# Patient Record
Sex: FEMALE | Race: AMERICAN INDIAN OR ALASKA NATIVE | HISPANIC OR LATINO | Employment: FULL TIME | URBAN - METROPOLITAN AREA
[De-identification: names, ages, dates, MRNs, and addresses within clinical notes are randomized per-mention and may not be internally consistent; named-entity substitution may affect disease eponyms.]

---

## 2017-08-16 ENCOUNTER — OFFICE VISIT (OUTPATIENT)
Dept: OPHTHALMOLOGY | Facility: CLINIC | Age: 22
End: 2017-08-16

## 2017-08-16 DIAGNOSIS — C72.30 GLIOMA OF OPTIC NERVE (H): ICD-10-CM

## 2017-08-16 DIAGNOSIS — Q07.8 MORNING GLORY OPTIC DISC ANOMALY ASSOCIATED WITH MUTATION IN PAX6 GENE (H): Primary | ICD-10-CM

## 2017-08-16 DIAGNOSIS — H46.9 OPTIC NEUROPATHY: ICD-10-CM

## 2017-08-16 RX ORDER — NICOTINE POLACRILEX 4 MG/1
GUM, CHEWING ORAL
COMMUNITY
End: 2022-01-28

## 2017-08-16 RX ORDER — SUCRALFATE 1 G/1
TABLET ORAL
COMMUNITY
Start: 2017-05-31 | End: 2022-07-12

## 2017-08-16 RX ORDER — IMIPRAMINE HYDROCHLORIDE 10 MG/1
TABLET, FILM COATED ORAL
COMMUNITY
Start: 2017-05-23 | End: 2022-01-28

## 2017-08-16 ASSESSMENT — REFRACTION_MANIFEST
OS_SPHERE: +0.75
OS_SPHERE: +1.00
OS_AXIS: 075
OD_SPHERE: BALANCE
OD_SPHERE: BALANCE
OS_CYLINDER: +1.00
OS_CYLINDER: +1.50
OS_AXIS: 075

## 2017-08-16 ASSESSMENT — CUP TO DISC RATIO
OD_RATIO: NO VIEW
OS_RATIO: 0.4

## 2017-08-16 ASSESSMENT — VISUAL ACUITY
OS_CC: 20/15
CORRECTION_TYPE: GLASSES
METHOD_MR_RETINOSCOPY: 1
METHOD: SNELLEN - LINEAR
OS_CC+: -3
OD_CC: NLP

## 2017-08-16 ASSESSMENT — CONF VISUAL FIELD
OD_SUPERIOR_TEMPORAL_RESTRICTION: 1
OD_INFERIOR_TEMPORAL_RESTRICTION: 1
OD_SUPERIOR_NASAL_RESTRICTION: 1
OD_INFERIOR_NASAL_RESTRICTION: 1

## 2017-08-16 ASSESSMENT — REFRACTION_WEARINGRX
OS_CYLINDER: +1.00
OS_SPHERE: +1.00
OD_SPHERE: BALANCE
SPECS_TYPE: SVL
OS_AXIS: 074

## 2017-08-16 ASSESSMENT — SLIT LAMP EXAM - LIDS
COMMENTS: PTOSIS
COMMENTS: NORMAL

## 2017-08-16 ASSESSMENT — EXTERNAL EXAM - RIGHT EYE: OD_EXAM: NORMAL

## 2017-08-16 ASSESSMENT — EXTERNAL EXAM - LEFT EYE: OS_EXAM: NORMAL

## 2017-08-16 ASSESSMENT — TONOMETRY
OD_IOP_MMHG: 03
IOP_METHOD: ICARE
OS_IOP_MMHG: 16

## 2017-08-16 NOTE — PROGRESS NOTES
Assessment & Plan     Melissa Eastman is a 21 year old female with the following diagnoses:   1. Morning glory optic disc anomaly    2. Optic neuropathy    3. Glioma of optic nerve (H)       Follow up for MGDA RIGHT eye and optic nerve glioma in the RIGHT eye and right side of chiasm.  Stable on MRI in May 2016.  Her visual acuity is great in the LEFT eye and the visual field is normal.  The patient was told to wear glasses at all times to protect the good eye. The patient expressed understanding.      She has floaters and flashes.  No tobacco dust, retinal tear, or retinal detachment seen.   Retinal detachment warning symptoms were discussed.  The patient was asked to call as soon as possible if they occurred.      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            Attending Physician Attestation:  Complete documentation of historical and exam elements from today's encounter can be found in the full encounter summary report (not reduplicated in this progress note).  I personally obtained the chief complaint(s) and history of present illness.  I confirmed and edited as necessary the review of systems, past medical/surgical history, family history, social history, and examination findings as documented by others; and I examined the patient myself.  I personally reviewed the relevant tests, images, and reports as documented above.  I formulated and edited as necessary the assessment and plan and discussed the findings and management plan with the patient and family. - Isaías Willis MD

## 2017-08-16 NOTE — MR AVS SNAPSHOT
After Visit Summary   2017    Melissa Eastman    MRN: [id number]           Patient Information     Date Of Birth          1995        Visit Information        Provider Department      2017 2:45 PM Isaías Willis MD [facility]hthalmology        Today's Diagnoses     Morning glory optic disc anomaly    -  1    Optic neuropathy        Glioma of optic nerve (H)           Follow-ups after your visit        Who to contact     Please call your clinic at [phone] to:    Ask questions about your health    Make or cancel appointments    Discuss your medicines    Learn about your test results    Speak to your doctor   If you have compliments or concerns about an experience at your clinic, or if you wish to file a complaint, please contact [facility] Patient Relations at [phone] or email us at [email or web address]         Additional Information About Your Visit        MyChart Information     [employer] is an electronic gateway that provides easy, online access to your medical records. With [employer], you can request a clinic appointment, read your test results, renew a prescription or communicate with your care team.     To sign up for [employer] visit the website at [email or web address]   You will be asked to enter the access code listed below, as well as some personal information. Please follow the directions to create your username and password.     Your access code is: [email or web address]  Expires: 10/2/2017  6:30 AM     Your access code will  in 90 days. If you need help or a new code, please contact your [facility] or call [phone] for assistance.        Care EveryWhere ID     This is your Care EveryWhere ID. This could be used by other organizations to access your [place] medical records  [id number]         Blood Pressure from Last 3 Encounters:   No data found for BP    Weight from Last 3  Encounters:   No data found for Wt              We Performed the Following     Color Vision - Screening OU (both eyes)     DILATED FUNDUS EXAM     Glaucoma Top OU     IOP Measurement        Primary Care Provider Office Phone # Fax #    [facility] [phone]       [facility] [address], [address]  [facility] [address]        Equal Access to Services     ERIBERTO LEWIS : Hadii aad ku hadasho Soomaali, waaxda luqadaha, qaybta kaalmada adeegyada, waxay idiin hayaan adeeg khandrewjuan laallin ah. So [facility] [phone].    ATENCIÓN: Si habla español, tiene a agustin disposición servicios gratuitos de asistencia lingüística. Danielito al [phone].    We comply with applicable federal civil rights laws and [place] laws. We do not discriminate on the basis of race, color, national origin, age, disability sex, sexual orientation or gender identity.            Thank you!     Thank you for choosing [facility] OPHTHALMOLOGY  for your care. Our goal is always to provide you with excellent care. Hearing back from our patients is one way we can continue to improve our services. Please take a few minutes to complete the written survey that you may receive in the mail after your visit with us. Thank you!             Your Updated Medication List - Protect others around you: Learn how to safely use, store and throw away your medicines at www.disposemymeds.org.          This list is accurate as of: 8/16/17  3:44 PM.  Always use your most recent med list.                   Brand Name Dispense Instructions for use Diagnosis    etonogestrel-ethinyl estradiol 0.12-0.015 MG/24HR vaginal ring    NUVARING     Place 1 each vaginally every 28 days        imipramine 10 MG tablet    TOFRANIL     Take 1 tablet by mouth at  bedtime        omeprazole 20 MG tablet           sucralfate 1 GM tablet    CARAFATE     Take 1 tablet by mouth two  times daily        UNKNOWN TO PATIENT      Pt taking Biotin

## 2017-08-16 NOTE — NURSING NOTE
Chief Complaints and History of Present Illnesses   Patient presents with     Follow Up For     15 month f/u Optic neuropathy     HPI    Affected eye(s):  Both   Symptoms:     Blurred vision (Comment:  )   Difficulty with driving   Floaters (Comment: intermittently they get dark grey and turn translucent. )   No flashes         Do you have eye pain now?:  No      Comments:  Patient feels that she sees patterns, starts, and things moving that aren't there, feels she has intermittent pain in the RE - lasts a few minutes    Rita Quarles August 16, 2017 3:04 PM

## 2017-10-19 ENCOUNTER — OFFICE VISIT - HEALTHEAST (OUTPATIENT)
Dept: FAMILY MEDICINE | Facility: CLINIC | Age: 22
End: 2017-10-19

## 2017-10-19 DIAGNOSIS — K58.9 IRRITABLE BOWEL SYNDROME: ICD-10-CM

## 2017-10-19 DIAGNOSIS — Z00.00 ROUTINE GENERAL MEDICAL EXAMINATION AT A HEALTH CARE FACILITY: ICD-10-CM

## 2017-10-19 DIAGNOSIS — G89.29 CHRONIC LOW BACK PAIN: ICD-10-CM

## 2017-10-19 DIAGNOSIS — F41.1 ANXIETY STATE: ICD-10-CM

## 2017-10-19 DIAGNOSIS — Z30.9 CONTRACEPTION MANAGEMENT: ICD-10-CM

## 2017-10-19 DIAGNOSIS — H26.9 CATARACT, RIGHT: ICD-10-CM

## 2017-10-19 DIAGNOSIS — E66.811 OBESITY (BMI 30.0-34.9): ICD-10-CM

## 2017-10-19 DIAGNOSIS — Z72.51 HIGH RISK SEXUAL BEHAVIOR: ICD-10-CM

## 2017-10-19 DIAGNOSIS — M54.50 CHRONIC LOW BACK PAIN: ICD-10-CM

## 2017-10-19 DIAGNOSIS — H02.401 PTOSIS OF RIGHT EYELID: ICD-10-CM

## 2017-10-19 DIAGNOSIS — G47.00 INSOMNIA: ICD-10-CM

## 2017-10-19 DIAGNOSIS — Q14.2 OPTIC DISC ANOMALY: ICD-10-CM

## 2017-10-19 LAB
HBV SURFACE AG SERPL QL IA: NEGATIVE
HCV AB SERPL QL IA: NEGATIVE
HIV 1+2 AB+HIV1 P24 AG SERPL QL IA: NEGATIVE

## 2017-10-19 ASSESSMENT — MIFFLIN-ST. JEOR: SCORE: 1525.6

## 2017-10-20 LAB
HEPATITIS B SURFACE ANTIBODY LHE- HISTORICAL: NEGATIVE
SYPHILIS RPR SCREEN - HISTORICAL: NORMAL

## 2017-11-06 ENCOUNTER — COMMUNICATION - HEALTHEAST (OUTPATIENT)
Dept: FAMILY MEDICINE | Facility: CLINIC | Age: 22
End: 2017-11-06

## 2018-01-26 ENCOUNTER — OFFICE VISIT - HEALTHEAST (OUTPATIENT)
Dept: SLEEP MEDICINE | Facility: CLINIC | Age: 23
End: 2018-01-26

## 2018-01-26 DIAGNOSIS — G47.8 SLEEP DYSFUNCTION WITH SLEEP STAGE DISTURBANCE: ICD-10-CM

## 2018-01-26 DIAGNOSIS — G47.10 HYPERSOMNIA: ICD-10-CM

## 2018-01-26 DIAGNOSIS — G47.00 PERSISTENT INSOMNIA: ICD-10-CM

## 2018-01-26 DIAGNOSIS — G47.20 CIRCADIAN DYSREGULATION: ICD-10-CM

## 2018-01-26 ASSESSMENT — MIFFLIN-ST. JEOR: SCORE: 1475.26

## 2018-02-07 ENCOUNTER — AMBULATORY - HEALTHEAST (OUTPATIENT)
Dept: SLEEP MEDICINE | Facility: CLINIC | Age: 23
End: 2018-02-07

## 2018-03-16 ENCOUNTER — COMMUNICATION - HEALTHEAST (OUTPATIENT)
Dept: SLEEP MEDICINE | Facility: CLINIC | Age: 23
End: 2018-03-16

## 2018-08-09 ENCOUNTER — OFFICE VISIT - HEALTHEAST (OUTPATIENT)
Dept: FAMILY MEDICINE | Facility: CLINIC | Age: 23
End: 2018-08-09

## 2018-08-09 DIAGNOSIS — J30.2 SEASONAL ALLERGIES: ICD-10-CM

## 2018-08-09 DIAGNOSIS — M54.50 CHRONIC LOW BACK PAIN: ICD-10-CM

## 2018-08-09 DIAGNOSIS — Z78.9 NOT IMMUNE TO HEPATITIS B VIRUS: ICD-10-CM

## 2018-08-09 DIAGNOSIS — Z11.3 ROUTINE SCREENING FOR STI (SEXUALLY TRANSMITTED INFECTION): ICD-10-CM

## 2018-08-09 DIAGNOSIS — F43.22 ADJUSTMENT DISORDER WITH ANXIOUS MOOD: ICD-10-CM

## 2018-08-09 DIAGNOSIS — Z30.9 CONTRACEPTION MANAGEMENT: ICD-10-CM

## 2018-08-09 DIAGNOSIS — Q14.2 OPTIC DISC ANOMALY: ICD-10-CM

## 2018-08-09 DIAGNOSIS — F12.20 CANNABIS DEPENDENCE, UNCOMPLICATED (H): ICD-10-CM

## 2018-08-09 DIAGNOSIS — K58.9 IRRITABLE BOWEL SYNDROME: ICD-10-CM

## 2018-08-09 DIAGNOSIS — Z00.00 ROUTINE GENERAL MEDICAL EXAMINATION AT A HEALTH CARE FACILITY: ICD-10-CM

## 2018-08-09 DIAGNOSIS — L60.3 BRITTLE NAILS: ICD-10-CM

## 2018-08-09 DIAGNOSIS — C72.50: ICD-10-CM

## 2018-08-09 DIAGNOSIS — E66.3 OVERWEIGHT WITH BODY MASS INDEX (BMI) 25.0-29.9: ICD-10-CM

## 2018-08-09 DIAGNOSIS — G89.29 CHRONIC LOW BACK PAIN: ICD-10-CM

## 2018-08-09 LAB
CHOLEST SERPL-MCNC: 217 MG/DL
CLUE CELLS: NORMAL
FASTING STATUS PATIENT QL REPORTED: YES
FASTING STATUS PATIENT QL REPORTED: YES
GLUCOSE BLD-MCNC: 77 MG/DL (ref 70–99)
HDLC SERPL-MCNC: 72 MG/DL
HIV 1+2 AB+HIV1 P24 AG SERPL QL IA: NEGATIVE
LDLC SERPL CALC-MCNC: 127 MG/DL
TRICHOMONAS, WET PREP: NORMAL
TRIGL SERPL-MCNC: 92 MG/DL
TSH SERPL DL<=0.005 MIU/L-ACNC: 0.66 UIU/ML (ref 0.3–5)
YEAST, WET PREP: NORMAL

## 2018-08-09 ASSESSMENT — MIFFLIN-ST. JEOR: SCORE: 1448.49

## 2018-08-10 ENCOUNTER — COMMUNICATION - HEALTHEAST (OUTPATIENT)
Dept: FAMILY MEDICINE | Facility: CLINIC | Age: 23
End: 2018-08-10

## 2018-08-10 LAB
C TRACH DNA SPEC QL PROBE+SIG AMP: NEGATIVE
HCV AB SERPL QL IA: NEGATIVE
HEPATITIS B SURFACE ANTIBODY LHE- HISTORICAL: NEGATIVE
N GONORRHOEA DNA SPEC QL NAA+PROBE: NEGATIVE
T PALLIDUM AB SER QL: NEGATIVE

## 2018-08-14 ENCOUNTER — COMMUNICATION - HEALTHEAST (OUTPATIENT)
Dept: FAMILY MEDICINE | Facility: CLINIC | Age: 23
End: 2018-08-14

## 2018-08-14 ENCOUNTER — AMBULATORY - HEALTHEAST (OUTPATIENT)
Dept: FAMILY MEDICINE | Facility: CLINIC | Age: 23
End: 2018-08-14

## 2018-08-14 DIAGNOSIS — Z11.3 ROUTINE SCREENING FOR STI (SEXUALLY TRANSMITTED INFECTION): ICD-10-CM

## 2018-08-14 DIAGNOSIS — R87.613 HSIL (HIGH GRADE SQUAMOUS INTRAEPITHELIAL LESION) ON PAP SMEAR OF CERVIX: ICD-10-CM

## 2018-08-14 LAB
BKR LAB AP ABNORMAL BLEEDING: NO
BKR LAB AP BIRTH CONTROL/HORMONES: ABNORMAL
BKR LAB AP CERVICAL APPEARANCE: NORMAL
BKR LAB AP GYN ADEQUACY: ABNORMAL
BKR LAB AP GYN INTERPRETATION: ABNORMAL
BKR LAB AP HPV REFLEX: ABNORMAL
BKR LAB AP LMP: ABNORMAL
BKR LAB AP PATIENT STATUS: ABNORMAL
BKR LAB AP PREVIOUS ABNORMAL: ABNORMAL
BKR LAB AP PREVIOUS NORMAL: ABNORMAL
HIGH RISK?: NO
HPV SOURCE: ABNORMAL
HUMAN PAPILLOMA VIRUS 16 DNA: NEGATIVE
HUMAN PAPILLOMA VIRUS 18 DNA: NEGATIVE
HUMAN PAPILLOMA VIRUS FINAL DIAGNOSIS: ABNORMAL
HUMAN PAPILLOMA VIRUS OTHER HR: POSITIVE
PATH REPORT.COMMENTS IMP SPEC: ABNORMAL
RESULT FLAG (HE HISTORICAL CONVERSION): ABNORMAL
SPECIMEN DESCRIPTION: ABNORMAL

## 2018-09-21 ENCOUNTER — RECORDS - HEALTHEAST (OUTPATIENT)
Dept: ADMINISTRATIVE | Facility: OTHER | Age: 23
End: 2018-09-21

## 2019-01-29 ENCOUNTER — OFFICE VISIT - HEALTHEAST (OUTPATIENT)
Dept: FAMILY MEDICINE | Facility: CLINIC | Age: 24
End: 2019-01-29

## 2019-01-29 DIAGNOSIS — T14.8XXA SPLINTER: ICD-10-CM

## 2019-02-07 ENCOUNTER — OFFICE VISIT - HEALTHEAST (OUTPATIENT)
Dept: FAMILY MEDICINE | Facility: CLINIC | Age: 24
End: 2019-02-07

## 2019-02-07 DIAGNOSIS — F33.8 SEASONAL AFFECTIVE DISORDER (H): ICD-10-CM

## 2019-02-07 DIAGNOSIS — K58.9 IRRITABLE BOWEL SYNDROME: ICD-10-CM

## 2019-02-07 DIAGNOSIS — F41.1 GENERALIZED ANXIETY DISORDER: ICD-10-CM

## 2019-02-07 DIAGNOSIS — F41.0 PANIC DISORDER WITHOUT AGORAPHOBIA: ICD-10-CM

## 2019-02-07 DIAGNOSIS — M54.5 CHRONIC LOW BACK PAIN, UNSPECIFIED BACK PAIN LATERALITY, WITH SCIATICA PRESENCE UNSPECIFIED: ICD-10-CM

## 2019-02-07 DIAGNOSIS — G89.29 CHRONIC LOW BACK PAIN, UNSPECIFIED BACK PAIN LATERALITY, WITH SCIATICA PRESENCE UNSPECIFIED: ICD-10-CM

## 2019-02-07 DIAGNOSIS — F33.1 MODERATE RECURRENT MAJOR DEPRESSION (H): ICD-10-CM

## 2019-02-07 LAB
TSH SERPL DL<=0.005 MIU/L-ACNC: 0.47 UIU/ML (ref 0.3–5)
VIT B12 SERPL-MCNC: 292 PG/ML (ref 213–816)

## 2019-02-08 LAB
25(OH)D3 SERPL-MCNC: 27.9 NG/ML (ref 30–80)
25(OH)D3 SERPL-MCNC: 27.9 NG/ML (ref 30–80)

## 2019-02-10 ENCOUNTER — COMMUNICATION - HEALTHEAST (OUTPATIENT)
Dept: FAMILY MEDICINE | Facility: CLINIC | Age: 24
End: 2019-02-10

## 2019-02-11 ENCOUNTER — AMBULATORY - HEALTHEAST (OUTPATIENT)
Dept: FAMILY MEDICINE | Facility: CLINIC | Age: 24
End: 2019-02-11

## 2019-02-11 DIAGNOSIS — E53.8 LOW SERUM VITAMIN B12: ICD-10-CM

## 2019-02-15 ENCOUNTER — AMBULATORY - HEALTHEAST (OUTPATIENT)
Dept: LAB | Facility: CLINIC | Age: 24
End: 2019-02-15

## 2019-02-15 DIAGNOSIS — E53.8 LOW SERUM VITAMIN B12: ICD-10-CM

## 2019-02-17 LAB — METHYLMALONATE SERPL-SCNC: 0.16 UMOL/L (ref 0–0.4)

## 2019-02-19 LAB
FASTING STATUS PATIENT QL REPORTED: YES
HOMOCYSTEINE PLASMA - HISTORICAL: 7 UMOL/L (ref 0–13)

## 2019-02-25 ENCOUNTER — TELEPHONE (OUTPATIENT)
Dept: OPHTHALMOLOGY | Facility: CLINIC | Age: 24
End: 2019-02-25

## 2019-02-25 NOTE — TELEPHONE ENCOUNTER
[facility] Call Center    Phone Message    May a detailed message be left on voicemail: yes    Reason for Call: Raul is needing Dr. Isaías Willis to fax pts prescription to  Fax#[phone] per Raul. Thank you  Action Taken: Message routed to:  [facility]

## 2019-02-28 ENCOUNTER — TELEPHONE (OUTPATIENT)
Dept: OPHTHALMOLOGY | Facility: CLINIC | Age: 24
End: 2019-02-28

## 2019-03-08 ENCOUNTER — RECORDS - HEALTHEAST (OUTPATIENT)
Dept: ADMINISTRATIVE | Facility: OTHER | Age: 24
End: 2019-03-08

## 2019-03-08 ENCOUNTER — OFFICE VISIT - HEALTHEAST (OUTPATIENT)
Dept: FAMILY MEDICINE | Facility: CLINIC | Age: 24
End: 2019-03-08

## 2019-03-08 DIAGNOSIS — F41.1 GENERALIZED ANXIETY DISORDER: ICD-10-CM

## 2019-03-08 DIAGNOSIS — F33.1 MODERATE RECURRENT MAJOR DEPRESSION (H): ICD-10-CM

## 2019-03-08 DIAGNOSIS — F33.8 SEASONAL AFFECTIVE DISORDER (H): ICD-10-CM

## 2019-03-08 DIAGNOSIS — G89.29 CHRONIC LOW BACK PAIN, UNSPECIFIED BACK PAIN LATERALITY, WITH SCIATICA PRESENCE UNSPECIFIED: ICD-10-CM

## 2019-03-08 DIAGNOSIS — M54.5 CHRONIC LOW BACK PAIN, UNSPECIFIED BACK PAIN LATERALITY, WITH SCIATICA PRESENCE UNSPECIFIED: ICD-10-CM

## 2019-03-08 ASSESSMENT — MIFFLIN-ST. JEOR: SCORE: 1456.43

## 2019-03-20 ENCOUNTER — COMMUNICATION - HEALTHEAST (OUTPATIENT)
Dept: FAMILY MEDICINE | Facility: CLINIC | Age: 24
End: 2019-03-20

## 2019-03-28 ENCOUNTER — COMMUNICATION - HEALTHEAST (OUTPATIENT)
Dept: FAMILY MEDICINE | Facility: CLINIC | Age: 24
End: 2019-03-28

## 2019-03-28 DIAGNOSIS — F33.1 MODERATE RECURRENT MAJOR DEPRESSION (H): ICD-10-CM

## 2019-03-28 DIAGNOSIS — F41.1 GENERALIZED ANXIETY DISORDER: ICD-10-CM

## 2019-03-28 DIAGNOSIS — F33.8 SEASONAL AFFECTIVE DISORDER (H): ICD-10-CM

## 2019-03-31 ENCOUNTER — RECORDS - HEALTHEAST (OUTPATIENT)
Dept: ADMINISTRATIVE | Facility: OTHER | Age: 24
End: 2019-03-31

## 2019-04-05 ENCOUNTER — OFFICE VISIT - HEALTHEAST (OUTPATIENT)
Dept: FAMILY MEDICINE | Facility: CLINIC | Age: 24
End: 2019-04-05

## 2019-04-05 DIAGNOSIS — F41.1 GENERALIZED ANXIETY DISORDER: ICD-10-CM

## 2019-04-05 DIAGNOSIS — K29.20 ACUTE ALCOHOLIC GASTRITIS WITHOUT HEMORRHAGE: ICD-10-CM

## 2019-04-05 DIAGNOSIS — F33.1 MODERATE RECURRENT MAJOR DEPRESSION (H): ICD-10-CM

## 2019-04-09 ENCOUNTER — COMMUNICATION - HEALTHEAST (OUTPATIENT)
Dept: FAMILY MEDICINE | Facility: CLINIC | Age: 24
End: 2019-04-09

## 2019-04-09 DIAGNOSIS — F33.1 MODERATE RECURRENT MAJOR DEPRESSION (H): ICD-10-CM

## 2019-05-06 ENCOUNTER — COMMUNICATION - HEALTHEAST (OUTPATIENT)
Dept: FAMILY MEDICINE | Facility: CLINIC | Age: 24
End: 2019-05-06

## 2019-05-06 ENCOUNTER — OFFICE VISIT - HEALTHEAST (OUTPATIENT)
Dept: FAMILY MEDICINE | Facility: CLINIC | Age: 24
End: 2019-05-06

## 2019-05-06 DIAGNOSIS — R68.2 DRY MOUTH: ICD-10-CM

## 2019-05-06 DIAGNOSIS — R87.613 HSIL (HIGH GRADE SQUAMOUS INTRAEPITHELIAL LESION) ON PAP SMEAR OF CERVIX: ICD-10-CM

## 2019-05-06 DIAGNOSIS — N93.0 BLEEDING AFTER INTERCOURSE: ICD-10-CM

## 2019-05-06 DIAGNOSIS — Z30.012 EMERGENCY CONTRACEPTION: ICD-10-CM

## 2019-05-06 LAB
CLUE CELLS: NORMAL
ERYTHROCYTE [DISTWIDTH] IN BLOOD BY AUTOMATED COUNT: 12.3 % (ref 11–14.5)
HBA1C MFR BLD: 5.4 % (ref 3.5–6)
HCT VFR BLD AUTO: 46.8 % (ref 35–47)
HGB BLD-MCNC: 15.5 G/DL (ref 12–16)
MCH RBC QN AUTO: 28.4 PG (ref 27–34)
MCHC RBC AUTO-ENTMCNC: 33.2 G/DL (ref 32–36)
MCV RBC AUTO: 85 FL (ref 80–100)
PLATELET # BLD AUTO: 277 THOU/UL (ref 140–440)
PMV BLD AUTO: 9 FL (ref 7–10)
RBC # BLD AUTO: 5.48 MILL/UL (ref 3.8–5.4)
TRICHOMONAS, WET PREP: NORMAL
WBC: 4.2 THOU/UL (ref 4–11)
YEAST, WET PREP: NORMAL

## 2019-05-07 LAB
C TRACH DNA SPEC QL PROBE+SIG AMP: NEGATIVE
N GONORRHOEA DNA SPEC QL NAA+PROBE: NEGATIVE

## 2019-05-09 ENCOUNTER — COMMUNICATION - HEALTHEAST (OUTPATIENT)
Dept: FAMILY MEDICINE | Facility: CLINIC | Age: 24
End: 2019-05-09

## 2019-05-31 ENCOUNTER — RECORDS - HEALTHEAST (OUTPATIENT)
Dept: ADMINISTRATIVE | Facility: OTHER | Age: 24
End: 2019-05-31

## 2019-05-31 LAB
HPV_EXT - HISTORICAL: NORMAL
PAP SMEAR - HIM PATIENT REPORTED: ABNORMAL

## 2019-06-18 ENCOUNTER — RECORDS - HEALTHEAST (OUTPATIENT)
Dept: HEALTH INFORMATION MANAGEMENT | Facility: CLINIC | Age: 24
End: 2019-06-18

## 2019-07-17 ENCOUNTER — COMMUNICATION - HEALTHEAST (OUTPATIENT)
Dept: FAMILY MEDICINE | Facility: CLINIC | Age: 24
End: 2019-07-17

## 2019-07-17 DIAGNOSIS — F41.1 GENERALIZED ANXIETY DISORDER: ICD-10-CM

## 2019-07-17 DIAGNOSIS — F33.1 MODERATE RECURRENT MAJOR DEPRESSION (H): ICD-10-CM

## 2019-07-24 ENCOUNTER — COMMUNICATION - HEALTHEAST (OUTPATIENT)
Dept: FAMILY MEDICINE | Facility: CLINIC | Age: 24
End: 2019-07-24

## 2019-07-24 DIAGNOSIS — F41.1 GENERALIZED ANXIETY DISORDER: ICD-10-CM

## 2019-07-24 DIAGNOSIS — F33.1 MODERATE RECURRENT MAJOR DEPRESSION (H): ICD-10-CM

## 2019-08-19 ENCOUNTER — COMMUNICATION - HEALTHEAST (OUTPATIENT)
Dept: FAMILY MEDICINE | Facility: CLINIC | Age: 24
End: 2019-08-19

## 2019-08-19 DIAGNOSIS — Z30.9 CONTRACEPTION MANAGEMENT: ICD-10-CM

## 2019-09-16 ENCOUNTER — COMMUNICATION - HEALTHEAST (OUTPATIENT)
Dept: FAMILY MEDICINE | Facility: CLINIC | Age: 24
End: 2019-09-16

## 2019-09-16 DIAGNOSIS — F41.1 GENERALIZED ANXIETY DISORDER: ICD-10-CM

## 2019-09-16 DIAGNOSIS — F33.1 MODERATE RECURRENT MAJOR DEPRESSION (H): ICD-10-CM

## 2019-10-03 ENCOUNTER — OFFICE VISIT - HEALTHEAST (OUTPATIENT)
Dept: FAMILY MEDICINE | Facility: CLINIC | Age: 24
End: 2019-10-03

## 2019-10-03 DIAGNOSIS — Z00.00 HEALTH CARE MAINTENANCE: ICD-10-CM

## 2019-10-03 DIAGNOSIS — F41.1 GENERALIZED ANXIETY DISORDER: ICD-10-CM

## 2019-10-03 DIAGNOSIS — Z20.2 POSSIBLE EXPOSURE TO STD: ICD-10-CM

## 2019-10-03 DIAGNOSIS — M54.50 CHRONIC LOW BACK PAIN, UNSPECIFIED BACK PAIN LATERALITY, UNSPECIFIED WHETHER SCIATICA PRESENT: ICD-10-CM

## 2019-10-03 DIAGNOSIS — F33.1 MODERATE RECURRENT MAJOR DEPRESSION (H): ICD-10-CM

## 2019-10-03 DIAGNOSIS — G89.29 CHRONIC LOW BACK PAIN, UNSPECIFIED BACK PAIN LATERALITY, UNSPECIFIED WHETHER SCIATICA PRESENT: ICD-10-CM

## 2019-10-03 DIAGNOSIS — Z30.9 CONTRACEPTION MANAGEMENT: ICD-10-CM

## 2019-10-03 DIAGNOSIS — F33.8 SEASONAL AFFECTIVE DISORDER (H): ICD-10-CM

## 2019-10-03 DIAGNOSIS — F41.0 PANIC DISORDER WITHOUT AGORAPHOBIA: ICD-10-CM

## 2019-10-03 LAB
CLUE CELLS: NORMAL
HIV 1+2 AB+HIV1 P24 AG SERPL QL IA: NEGATIVE
TRICHOMONAS, WET PREP: NORMAL
YEAST, WET PREP: NORMAL

## 2019-10-04 LAB
C TRACH DNA SPEC QL PROBE+SIG AMP: NEGATIVE
HBV SURFACE AG SERPL QL IA: NEGATIVE
HCV AB SERPL QL IA: NEGATIVE
HEPATITIS B SURFACE ANTIBODY LHE- HISTORICAL: POSITIVE
N GONORRHOEA DNA SPEC QL NAA+PROBE: NEGATIVE
T PALLIDUM AB SER QL: NEGATIVE

## 2019-10-06 ENCOUNTER — COMMUNICATION - HEALTHEAST (OUTPATIENT)
Dept: FAMILY MEDICINE | Facility: CLINIC | Age: 24
End: 2019-10-06

## 2019-10-06 DIAGNOSIS — F33.1 MODERATE RECURRENT MAJOR DEPRESSION (H): ICD-10-CM

## 2019-10-06 DIAGNOSIS — F41.1 GENERALIZED ANXIETY DISORDER: ICD-10-CM

## 2019-10-23 ENCOUNTER — COMMUNICATION - HEALTHEAST (OUTPATIENT)
Dept: FAMILY MEDICINE | Facility: CLINIC | Age: 24
End: 2019-10-23

## 2019-10-23 DIAGNOSIS — K58.9 IRRITABLE BOWEL SYNDROME: ICD-10-CM

## 2019-10-23 DIAGNOSIS — K58.9 IRRITABLE BOWEL SYNDROME, UNSPECIFIED TYPE: ICD-10-CM

## 2019-10-26 ENCOUNTER — OFFICE VISIT - HEALTHEAST (OUTPATIENT)
Dept: FAMILY MEDICINE | Facility: CLINIC | Age: 24
End: 2019-10-26

## 2019-10-26 DIAGNOSIS — L30.8 OTHER ECZEMA: ICD-10-CM

## 2019-10-26 ASSESSMENT — MIFFLIN-ST. JEOR: SCORE: 1581.17

## 2019-11-19 ENCOUNTER — OFFICE VISIT - HEALTHEAST (OUTPATIENT)
Dept: FAMILY MEDICINE | Facility: CLINIC | Age: 24
End: 2019-11-19

## 2019-11-19 ENCOUNTER — COMMUNICATION - HEALTHEAST (OUTPATIENT)
Dept: FAMILY MEDICINE | Facility: CLINIC | Age: 24
End: 2019-11-19

## 2019-11-19 DIAGNOSIS — R21 RASH AND NONSPECIFIC SKIN ERUPTION: ICD-10-CM

## 2019-11-19 ASSESSMENT — PATIENT HEALTH QUESTIONNAIRE - PHQ9: SUM OF ALL RESPONSES TO PHQ QUESTIONS 1-9: 15

## 2019-11-19 ASSESSMENT — MIFFLIN-ST. JEOR: SCORE: 1623.13

## 2019-12-16 ENCOUNTER — OFFICE VISIT - HEALTHEAST (OUTPATIENT)
Dept: FAMILY MEDICINE | Facility: CLINIC | Age: 24
End: 2019-12-16

## 2019-12-16 DIAGNOSIS — K58.9 IRRITABLE BOWEL SYNDROME, UNSPECIFIED TYPE: ICD-10-CM

## 2019-12-16 DIAGNOSIS — F33.1 MODERATE RECURRENT MAJOR DEPRESSION (H): ICD-10-CM

## 2019-12-16 DIAGNOSIS — R21 RASH: ICD-10-CM

## 2019-12-16 DIAGNOSIS — Z20.2 POSSIBLE EXPOSURE TO STD: ICD-10-CM

## 2019-12-16 DIAGNOSIS — G89.29 CHRONIC LOW BACK PAIN, UNSPECIFIED BACK PAIN LATERALITY, UNSPECIFIED WHETHER SCIATICA PRESENT: ICD-10-CM

## 2019-12-16 DIAGNOSIS — F41.1 GENERALIZED ANXIETY DISORDER: ICD-10-CM

## 2019-12-16 DIAGNOSIS — M54.50 CHRONIC LOW BACK PAIN, UNSPECIFIED BACK PAIN LATERALITY, UNSPECIFIED WHETHER SCIATICA PRESENT: ICD-10-CM

## 2019-12-16 DIAGNOSIS — F33.8 SEASONAL AFFECTIVE DISORDER (H): ICD-10-CM

## 2019-12-16 DIAGNOSIS — F41.0 PANIC DISORDER WITHOUT AGORAPHOBIA: ICD-10-CM

## 2019-12-16 ASSESSMENT — MIFFLIN-ST. JEOR: SCORE: 1635.03

## 2019-12-17 LAB
C TRACH DNA SPEC QL PROBE+SIG AMP: NEGATIVE
HCV AB SERPL QL IA: NEGATIVE
N GONORRHOEA DNA SPEC QL NAA+PROBE: NEGATIVE
T PALLIDUM AB SER QL: NEGATIVE

## 2019-12-27 ENCOUNTER — COMMUNICATION - HEALTHEAST (OUTPATIENT)
Dept: FAMILY MEDICINE | Facility: CLINIC | Age: 24
End: 2019-12-27

## 2019-12-27 DIAGNOSIS — M54.50 CHRONIC LOW BACK PAIN, UNSPECIFIED BACK PAIN LATERALITY, UNSPECIFIED WHETHER SCIATICA PRESENT: ICD-10-CM

## 2019-12-27 DIAGNOSIS — G89.29 CHRONIC LOW BACK PAIN, UNSPECIFIED BACK PAIN LATERALITY, UNSPECIFIED WHETHER SCIATICA PRESENT: ICD-10-CM

## 2019-12-27 DIAGNOSIS — F33.8 SEASONAL AFFECTIVE DISORDER (H): ICD-10-CM

## 2019-12-27 DIAGNOSIS — F12.20 CANNABIS DEPENDENCE, UNCOMPLICATED (H): ICD-10-CM

## 2019-12-27 DIAGNOSIS — F41.0 PANIC DISORDER WITHOUT AGORAPHOBIA: ICD-10-CM

## 2019-12-27 DIAGNOSIS — F33.1 MODERATE RECURRENT MAJOR DEPRESSION (H): ICD-10-CM

## 2019-12-27 DIAGNOSIS — K58.9 IRRITABLE BOWEL SYNDROME, UNSPECIFIED TYPE: ICD-10-CM

## 2019-12-27 DIAGNOSIS — F41.1 GENERALIZED ANXIETY DISORDER: ICD-10-CM

## 2019-12-31 ENCOUNTER — COMMUNICATION - HEALTHEAST (OUTPATIENT)
Dept: FAMILY MEDICINE | Facility: CLINIC | Age: 24
End: 2019-12-31

## 2020-01-14 ENCOUNTER — RECORDS - HEALTHEAST (OUTPATIENT)
Dept: ADMINISTRATIVE | Facility: OTHER | Age: 25
End: 2020-01-14

## 2020-01-14 ENCOUNTER — OFFICE VISIT - HEALTHEAST (OUTPATIENT)
Dept: FAMILY MEDICINE | Facility: CLINIC | Age: 25
End: 2020-01-14

## 2020-01-14 DIAGNOSIS — F33.8 SEASONAL AFFECTIVE DISORDER (H): ICD-10-CM

## 2020-01-14 DIAGNOSIS — F33.1 MODERATE RECURRENT MAJOR DEPRESSION (H): ICD-10-CM

## 2020-01-14 DIAGNOSIS — F41.0 PANIC DISORDER WITHOUT AGORAPHOBIA: ICD-10-CM

## 2020-01-14 DIAGNOSIS — F41.1 GENERALIZED ANXIETY DISORDER: ICD-10-CM

## 2020-01-14 ASSESSMENT — PATIENT HEALTH QUESTIONNAIRE - PHQ9: SUM OF ALL RESPONSES TO PHQ QUESTIONS 1-9: 18

## 2020-01-15 ENCOUNTER — RECORDS - HEALTHEAST (OUTPATIENT)
Dept: ADMINISTRATIVE | Facility: OTHER | Age: 25
End: 2020-01-15

## 2020-01-15 LAB
HPV_EXT - HISTORICAL: NORMAL
PAP SMEAR - HIM PATIENT REPORTED: ABNORMAL

## 2020-01-16 ENCOUNTER — COMMUNICATION - HEALTHEAST (OUTPATIENT)
Dept: FAMILY MEDICINE | Facility: CLINIC | Age: 25
End: 2020-01-16

## 2020-01-22 ENCOUNTER — OFFICE VISIT - HEALTHEAST (OUTPATIENT)
Dept: FAMILY MEDICINE | Facility: CLINIC | Age: 25
End: 2020-01-22

## 2020-01-22 DIAGNOSIS — J11.1 INFLUENZA-LIKE ILLNESS: ICD-10-CM

## 2020-01-29 ENCOUNTER — RECORDS - HEALTHEAST (OUTPATIENT)
Dept: HEALTH INFORMATION MANAGEMENT | Facility: CLINIC | Age: 25
End: 2020-01-29

## 2020-02-12 ENCOUNTER — RECORDS - HEALTHEAST (OUTPATIENT)
Dept: ADMINISTRATIVE | Facility: OTHER | Age: 25
End: 2020-02-12

## 2020-03-02 ENCOUNTER — COMMUNICATION - HEALTHEAST (OUTPATIENT)
Dept: FAMILY MEDICINE | Facility: CLINIC | Age: 25
End: 2020-03-02

## 2020-03-02 DIAGNOSIS — F41.1 GENERALIZED ANXIETY DISORDER: ICD-10-CM

## 2020-03-02 DIAGNOSIS — F33.8 SEASONAL AFFECTIVE DISORDER (H): ICD-10-CM

## 2020-03-02 DIAGNOSIS — F33.1 MODERATE RECURRENT MAJOR DEPRESSION (H): ICD-10-CM

## 2020-04-03 ENCOUNTER — COMMUNICATION - HEALTHEAST (OUTPATIENT)
Dept: FAMILY MEDICINE | Facility: CLINIC | Age: 25
End: 2020-04-03

## 2020-04-03 DIAGNOSIS — F33.1 MODERATE RECURRENT MAJOR DEPRESSION (H): ICD-10-CM

## 2020-04-03 DIAGNOSIS — F41.1 GENERALIZED ANXIETY DISORDER: ICD-10-CM

## 2020-04-03 DIAGNOSIS — F33.8 SEASONAL AFFECTIVE DISORDER (H): ICD-10-CM

## 2020-05-11 ENCOUNTER — COMMUNICATION - HEALTHEAST (OUTPATIENT)
Dept: FAMILY MEDICINE | Facility: CLINIC | Age: 25
End: 2020-05-11

## 2020-05-11 DIAGNOSIS — F33.8 SEASONAL AFFECTIVE DISORDER (H): ICD-10-CM

## 2020-05-11 DIAGNOSIS — F41.1 GENERALIZED ANXIETY DISORDER: ICD-10-CM

## 2020-05-11 DIAGNOSIS — F33.1 MODERATE RECURRENT MAJOR DEPRESSION (H): ICD-10-CM

## 2020-05-13 ENCOUNTER — COMMUNICATION - HEALTHEAST (OUTPATIENT)
Dept: FAMILY MEDICINE | Facility: CLINIC | Age: 25
End: 2020-05-13

## 2020-05-13 DIAGNOSIS — F41.1 GENERALIZED ANXIETY DISORDER: ICD-10-CM

## 2020-05-13 DIAGNOSIS — F41.0 PANIC DISORDER WITHOUT AGORAPHOBIA: ICD-10-CM

## 2020-05-13 DIAGNOSIS — F33.1 MODERATE RECURRENT MAJOR DEPRESSION (H): ICD-10-CM

## 2020-05-13 DIAGNOSIS — F33.8 SEASONAL AFFECTIVE DISORDER (H): ICD-10-CM

## 2020-05-22 ENCOUNTER — RECORDS - HEALTHEAST (OUTPATIENT)
Dept: ADMINISTRATIVE | Facility: OTHER | Age: 25
End: 2020-05-22

## 2020-05-28 ENCOUNTER — COMMUNICATION - HEALTHEAST (OUTPATIENT)
Dept: FAMILY MEDICINE | Facility: CLINIC | Age: 25
End: 2020-05-28

## 2020-08-26 ENCOUNTER — COMMUNICATION - HEALTHEAST (OUTPATIENT)
Dept: FAMILY MEDICINE | Facility: CLINIC | Age: 25
End: 2020-08-26

## 2020-08-26 DIAGNOSIS — R87.613 HSIL (HIGH GRADE SQUAMOUS INTRAEPITHELIAL LESION) ON PAP SMEAR OF CERVIX: ICD-10-CM

## 2020-08-28 ENCOUNTER — AMBULATORY - HEALTHEAST (OUTPATIENT)
Dept: INTERNAL MEDICINE | Facility: CLINIC | Age: 25
End: 2020-08-28

## 2020-08-31 ENCOUNTER — COMMUNICATION - HEALTHEAST (OUTPATIENT)
Dept: SCHEDULING | Facility: CLINIC | Age: 25
End: 2020-08-31

## 2020-08-31 DIAGNOSIS — R87.613 HSIL (HIGH GRADE SQUAMOUS INTRAEPITHELIAL LESION) ON PAP SMEAR OF CERVIX: ICD-10-CM

## 2020-09-01 NOTE — TELEPHONE ENCOUNTER
Detail Level: Detailed Last glasses Rx faxed per request at 0935  Rx from 8-2017.  Expires 8-2019  Adama Rae RN 9:36 AM 02/28/19        [facility]    Phone Message    May a detailed message be left on voicemail: yes    Reason for Call: Other: per pts mom mary ann- needs glasses rx faxed to eye care center @ [phone]-  gave us the wrong fax number originally, please fax her glasses rx, thank you      Action Taken: Message routed to:  [facility]

## 2020-09-02 ENCOUNTER — OFFICE VISIT - HEALTHEAST (OUTPATIENT)
Dept: INTERNAL MEDICINE | Facility: CLINIC | Age: 25
End: 2020-09-02

## 2020-09-02 ENCOUNTER — COMMUNICATION - HEALTHEAST (OUTPATIENT)
Dept: SCHEDULING | Facility: CLINIC | Age: 25
End: 2020-09-02

## 2020-09-02 DIAGNOSIS — N64.4 BREAST PAIN, LEFT: ICD-10-CM

## 2020-09-02 ASSESSMENT — MIFFLIN-ST. JEOR: SCORE: 1771.11

## 2020-09-17 ENCOUNTER — OFFICE VISIT (OUTPATIENT)
Dept: OPHTHALMOLOGY | Facility: CLINIC | Age: 25
End: 2020-09-17
Attending: OPHTHALMOLOGY
Payer: COMMERCIAL

## 2020-09-17 DIAGNOSIS — H53.40 VISUAL FIELD DEFECT: ICD-10-CM

## 2020-09-17 DIAGNOSIS — H53.40 VISUAL FIELD DEFECT: Primary | ICD-10-CM

## 2020-09-17 DIAGNOSIS — H44.521 PHTHISIS BULBI OF RIGHT EYE: Primary | ICD-10-CM

## 2020-09-17 PROCEDURE — 92015 DETERMINE REFRACTIVE STATE: CPT | Mod: ZF | Performed by: TECHNICIAN/TECHNOLOGIST

## 2020-09-17 PROCEDURE — 92083 EXTENDED VISUAL FIELD XM: CPT | Mod: ZF | Performed by: OPHTHALMOLOGY

## 2020-09-17 PROCEDURE — 92133 CPTRZD OPH DX IMG PST SGM ON: CPT | Mod: ZF | Performed by: OPHTHALMOLOGY

## 2020-09-17 PROCEDURE — 76512 OPH US DX B-SCAN: CPT | Mod: ZF | Performed by: OPHTHALMOLOGY

## 2020-09-17 PROCEDURE — G0463 HOSPITAL OUTPT CLINIC VISIT: HCPCS | Mod: ZF | Performed by: TECHNICIAN/TECHNOLOGIST

## 2020-09-17 ASSESSMENT — CONF VISUAL FIELD
OD_INFERIOR_NASAL_RESTRICTION: 1
OD_SUPERIOR_TEMPORAL_RESTRICTION: 1
OD_SUPERIOR_NASAL_RESTRICTION: 1
OD_INFERIOR_TEMPORAL_RESTRICTION: 1

## 2020-09-17 ASSESSMENT — TONOMETRY
IOP_METHOD: ICARE
OS_IOP_MMHG: 11
OD_IOP_MMHG: 03

## 2020-09-17 ASSESSMENT — VISUAL ACUITY
CORRECTION_TYPE: GLASSES
OD_CC: NLP
METHOD: SNELLEN - LINEAR
OS_CC: 20/20

## 2020-09-17 ASSESSMENT — REFRACTION_WEARINGRX
OD_SPHERE: BALANCE
OS_CYLINDER: +1.00
OS_SPHERE: +1.00
SPECS_TYPE: SVL
OS_AXIS: 074

## 2020-09-17 ASSESSMENT — REFRACTION_MANIFEST
OS_SPHERE: +0.25
OS_CYLINDER: +1.25
OS_AXIS: 075
OD_SPHERE: BALANCE

## 2020-09-17 ASSESSMENT — CUP TO DISC RATIO
OD_RATIO: NO VIEW
OS_RATIO: 0.6

## 2020-09-17 ASSESSMENT — SLIT LAMP EXAM - LIDS
COMMENTS: PTOSIS
COMMENTS: NORMAL

## 2020-09-17 ASSESSMENT — EXTERNAL EXAM - LEFT EYE: OS_EXAM: NORMAL

## 2020-09-17 NOTE — NURSING NOTE
Chief Complaint(s) and History of Present Illness(es)     Follow Up     In right eye (Follow up for MGDA RIGHT eye and optic nerve glioma in the RIGHT eye and right side of chiasm).  Associated symptoms include Negative for eye pain, headache and double vision.              Comments     Patient states no new changes. Vision each eye stable. Requesting for a new MRx, current ones are 3 years old.  NLP RIGHT eye.     RENATE Ceron 9/17/2020 1:12 PM                PHQ2/9 - currently on medications

## 2020-09-17 NOTE — PROGRESS NOTES
Assessment & Plan     Melissa Eastman is a 21 year old female with the following diagnoses:   1. Phthisis bulbi of right eye    2. Visual field defect       Follow up for morning glory disc anomaly in RIGHT eye and optic nerve glioma in the RIGHT eye and right side of chiasm. Last visit  Was 8/16/2017. States vision is stable since last visit. She denies eye pain. She has history of retinal detachment in right eye at the age of 4 but denies any new flashes or floaters in the left. She wears glasses full time.     Visual acuity is no light perception in right eye, but 20/20 in left. Color vision is normal in left eye.      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   B-scan ultrasound shows a likely Retinal detachment  RIGHT eye.  Follow up 1 year sooner as needed for worsening symptoms.             Attending Physician Attestation:  Complete documentation of historical and exam elements from today's encounter can be found in the full encounter summary report (not reduplicated in this progress note).  I personally obtained the chief complaint(s) and history of present illness.  I confirmed and edited as necessary the review of systems, past medical/surgical history, family history, social history, and examination findings as documented by others; and I examined the patient myself.  I personally reviewed the relevant tests, images, and reports as documented above.  I formulated and edited as necessary the assessment and plan and discussed the findings and management plan with the patient and family. I personally reviewed the ophthalmic test(s) associated with this encounter, agree with the interpretation(s) as documented by the resident/fellow, and have edited the corresponding report(s) as necessary.  - Isaías Senior MD  Ophthalmology Resident, PGY-3

## 2020-10-14 ENCOUNTER — COMMUNICATION - HEALTHEAST (OUTPATIENT)
Dept: FAMILY MEDICINE | Facility: CLINIC | Age: 25
End: 2020-10-14

## 2020-10-14 DIAGNOSIS — Z30.9 CONTRACEPTION MANAGEMENT: ICD-10-CM

## 2020-11-25 ENCOUNTER — OFFICE VISIT - HEALTHEAST (OUTPATIENT)
Dept: FAMILY MEDICINE | Facility: CLINIC | Age: 25
End: 2020-11-25

## 2020-11-25 DIAGNOSIS — F33.8 SEASONAL AFFECTIVE DISORDER (H): ICD-10-CM

## 2020-11-25 DIAGNOSIS — F33.1 MODERATE RECURRENT MAJOR DEPRESSION (H): ICD-10-CM

## 2020-11-25 DIAGNOSIS — F41.1 GENERALIZED ANXIETY DISORDER: ICD-10-CM

## 2020-11-25 DIAGNOSIS — E66.01 MORBID OBESITY (H): ICD-10-CM

## 2020-11-25 DIAGNOSIS — R87.613 HSIL (HIGH GRADE SQUAMOUS INTRAEPITHELIAL LESION) ON PAP SMEAR OF CERVIX: ICD-10-CM

## 2020-11-25 ASSESSMENT — PATIENT HEALTH QUESTIONNAIRE - PHQ9: SUM OF ALL RESPONSES TO PHQ QUESTIONS 1-9: 9

## 2020-12-28 ENCOUNTER — COMMUNICATION - HEALTHEAST (OUTPATIENT)
Dept: FAMILY MEDICINE | Facility: CLINIC | Age: 25
End: 2020-12-28

## 2020-12-28 DIAGNOSIS — K58.9 IRRITABLE BOWEL SYNDROME: ICD-10-CM

## 2020-12-28 DIAGNOSIS — K58.9 IRRITABLE BOWEL SYNDROME, UNSPECIFIED TYPE: ICD-10-CM

## 2020-12-29 ENCOUNTER — COMMUNICATION - HEALTHEAST (OUTPATIENT)
Dept: FAMILY MEDICINE | Facility: CLINIC | Age: 25
End: 2020-12-29

## 2020-12-29 DIAGNOSIS — K58.9 IRRITABLE BOWEL SYNDROME: ICD-10-CM

## 2021-01-04 ENCOUNTER — COMMUNICATION - HEALTHEAST (OUTPATIENT)
Dept: FAMILY MEDICINE | Facility: CLINIC | Age: 26
End: 2021-01-04

## 2021-01-04 DIAGNOSIS — F41.1 GENERALIZED ANXIETY DISORDER: ICD-10-CM

## 2021-01-04 DIAGNOSIS — F41.0 PANIC DISORDER WITHOUT AGORAPHOBIA: ICD-10-CM

## 2021-01-04 DIAGNOSIS — F33.1 MODERATE RECURRENT MAJOR DEPRESSION (H): ICD-10-CM

## 2021-01-04 DIAGNOSIS — F33.8 SEASONAL AFFECTIVE DISORDER (H): ICD-10-CM

## 2021-01-13 ENCOUNTER — COMMUNICATION - HEALTHEAST (OUTPATIENT)
Dept: FAMILY MEDICINE | Facility: CLINIC | Age: 26
End: 2021-01-13

## 2021-01-13 DIAGNOSIS — F33.1 MODERATE RECURRENT MAJOR DEPRESSION (H): ICD-10-CM

## 2021-01-13 DIAGNOSIS — F41.1 GENERALIZED ANXIETY DISORDER: ICD-10-CM

## 2021-01-13 DIAGNOSIS — F33.8 SEASONAL AFFECTIVE DISORDER (H): ICD-10-CM

## 2021-04-14 ENCOUNTER — COMMUNICATION - HEALTHEAST (OUTPATIENT)
Dept: FAMILY MEDICINE | Facility: CLINIC | Age: 26
End: 2021-04-14

## 2021-04-14 DIAGNOSIS — Z30.9 CONTRACEPTION MANAGEMENT: ICD-10-CM

## 2021-04-23 ENCOUNTER — OFFICE VISIT - HEALTHEAST (OUTPATIENT)
Dept: FAMILY MEDICINE | Facility: CLINIC | Age: 26
End: 2021-04-23

## 2021-04-23 ENCOUNTER — AMBULATORY - HEALTHEAST (OUTPATIENT)
Dept: FAMILY MEDICINE | Facility: CLINIC | Age: 26
End: 2021-04-23

## 2021-04-23 DIAGNOSIS — N76.0 BACTERIAL VAGINOSIS: ICD-10-CM

## 2021-04-23 DIAGNOSIS — B96.89 BACTERIAL VAGINOSIS: ICD-10-CM

## 2021-04-23 DIAGNOSIS — F33.8 SEASONAL AFFECTIVE DISORDER (H): ICD-10-CM

## 2021-04-23 DIAGNOSIS — F41.0 PANIC DISORDER WITHOUT AGORAPHOBIA: ICD-10-CM

## 2021-04-23 DIAGNOSIS — Z30.9 CONTRACEPTION MANAGEMENT: ICD-10-CM

## 2021-04-23 DIAGNOSIS — R87.619 ABNORMAL CERVICAL PAPANICOLAOU SMEAR, UNSPECIFIED ABNORMAL PAP FINDING: ICD-10-CM

## 2021-04-23 DIAGNOSIS — F33.1 MODERATE RECURRENT MAJOR DEPRESSION (H): ICD-10-CM

## 2021-04-23 DIAGNOSIS — K58.9 IRRITABLE BOWEL SYNDROME: ICD-10-CM

## 2021-04-23 DIAGNOSIS — J30.2 SEASONAL ALLERGIES: ICD-10-CM

## 2021-04-23 DIAGNOSIS — F41.1 GENERALIZED ANXIETY DISORDER: ICD-10-CM

## 2021-04-23 DIAGNOSIS — N89.8 VAGINAL DISCHARGE: ICD-10-CM

## 2021-04-23 LAB
CLUE CELLS: ABNORMAL
TRICHOMONAS, WET PREP: ABNORMAL
YEAST, WET PREP: ABNORMAL

## 2021-04-26 LAB
HPV SOURCE: NORMAL
HUMAN PAPILLOMA VIRUS 16 DNA: NEGATIVE
HUMAN PAPILLOMA VIRUS 18 DNA: NEGATIVE
HUMAN PAPILLOMA VIRUS FINAL DIAGNOSIS: NORMAL
HUMAN PAPILLOMA VIRUS OTHER HR: NEGATIVE
SPECIMEN DESCRIPTION: NORMAL

## 2021-04-27 LAB
C TRACH DNA SPEC QL PROBE+SIG AMP: NEGATIVE
N GONORRHOEA DNA SPEC QL NAA+PROBE: NEGATIVE

## 2021-04-30 ENCOUNTER — COMMUNICATION - HEALTHEAST (OUTPATIENT)
Dept: OBGYN | Facility: CLINIC | Age: 26
End: 2021-04-30

## 2021-04-30 LAB
BKR LAB AP ABNORMAL BLEEDING: NO
BKR LAB AP BIRTH CONTROL/HORMONES: ABNORMAL
BKR LAB AP CERVICAL APPEARANCE: NORMAL
BKR LAB AP GYN ADEQUACY: ABNORMAL
BKR LAB AP GYN INTERPRETATION: ABNORMAL
BKR LAB AP GYN OTHER FINDINGS: ABNORMAL
BKR LAB AP HPV REFLEX: ABNORMAL
BKR LAB AP LMP: ABNORMAL
BKR LAB AP PATIENT STATUS: ABNORMAL
BKR LAB AP PREVIOUS ABNORMAL: ABNORMAL
BKR LAB AP PREVIOUS NORMAL: ABNORMAL
HIGH RISK?: NO
PATH REPORT.COMMENTS IMP SPEC: ABNORMAL
RESULT FLAG (HE HISTORICAL CONVERSION): ABNORMAL

## 2021-05-10 ENCOUNTER — AMBULATORY - HEALTHEAST (OUTPATIENT)
Dept: FAMILY MEDICINE | Facility: CLINIC | Age: 26
End: 2021-05-10

## 2021-05-10 DIAGNOSIS — R87.610 ASCUS OF CERVIX WITH NEGATIVE HIGH RISK HPV: ICD-10-CM

## 2021-05-12 LAB
LAB AP CHARGES (HE HISTORICAL CONVERSION): NORMAL
PATH REPORT.COMMENTS IMP SPEC: NORMAL
PATH REPORT.COMMENTS IMP SPEC: NORMAL
PATH REPORT.FINAL DX SPEC: NORMAL
PATH REPORT.GROSS SPEC: NORMAL
PATH REPORT.MICROSCOPIC SPEC OTHER STN: NORMAL
PATH REPORT.RELEVANT HX SPEC: NORMAL
RESULT FLAG (HE HISTORICAL CONVERSION): NORMAL

## 2021-05-26 ASSESSMENT — PATIENT HEALTH QUESTIONNAIRE - PHQ9
SUM OF ALL RESPONSES TO PHQ QUESTIONS 1-9: 15
SUM OF ALL RESPONSES TO PHQ QUESTIONS 1-9: 18

## 2021-05-27 VITALS
SYSTOLIC BLOOD PRESSURE: 107 MMHG | DIASTOLIC BLOOD PRESSURE: 69 MMHG | RESPIRATION RATE: 20 BRPM | OXYGEN SATURATION: 97 % | HEART RATE: 85 BPM | TEMPERATURE: 98.4 F

## 2021-05-27 VITALS
WEIGHT: 241.5 LBS | TEMPERATURE: 98.5 F | HEART RATE: 69 BPM | RESPIRATION RATE: 12 BRPM | SYSTOLIC BLOOD PRESSURE: 110 MMHG | DIASTOLIC BLOOD PRESSURE: 74 MMHG

## 2021-05-27 ASSESSMENT — PATIENT HEALTH QUESTIONNAIRE - PHQ9: SUM OF ALL RESPONSES TO PHQ QUESTIONS 1-9: 9

## 2021-05-27 NOTE — PROGRESS NOTES
Office Visit  [facility]  Date of Service: 04/05/2019      Nomi Eastman is a 23 y.o. female who presents for Follow-up    Recent ER visit for alcohol-induced gastritis  Was sick with stomach bug  Violent vomiting for 5 hours  When drinks, drinks 6 shots at a time.    Date Time PHQ-9 score EMILIO-7 Current treatment Recommended treatment Follow up   2/7/19 0 wk 20 19 Therapy + no medications Add citalopram 10mg 2 weeks   3/8/19 4 wk 10  Therapy + citalopram 10mg (for two weeks) Increase citalopram to 20 mg daily 4 weeks    4/5/19 8 wk 6 10 Therapy + citalopram 20mg  Increase citalopram to 40 mg daily  4 weeks         Still having a lot of anxiety - especially catastrophic thinking. Most with driving. Self blame, other blame. All or nothing thinking.    Objective   /64 (Patient Site: Right Arm, Patient Position: Sitting, Cuff Size: Adult Regular)   Pulse 96   Resp 12   Wt 162 lb (73.5 kg)   BMI 29.16 kg/m      reports that  has never smoked. she has never used smokeless tobacco.    Gen: Alert, no apparent distress.  Heart: Regular rate and rhythm, no murmurs.  Lungs: Clear to auscultation bilaterally, no increased work of breathing.  Abdomen: Soft, non-tender, non-distended, bowel sounds normal.  Extremities: No clubbing, cyanosis, edema.    Assessment & Plan     1. Major depression, recurrent, moderate, in partial remission. Generalized anxiety disorder, partial remission. Increase citalopram from 20 mg to 40 mg. Continue therapy. Recheck in 4 weeks. Note: anxiety continues to be prominent. Discussed buspirone if anxiety not subsiding.  2. Alcohol induced gastritis related to binge-drinking episode. Discussed avoiding binge drinking. No alcohol for next six weeks to let stomach heal.      Order Summary                                                      1. Generalized anxiety disorder  citalopram (CELEXA) 40 MG tablet   2. Moderate recurrent major depression (H)   citalopram (CELEXA) 40 MG tablet      Future Appointments   Date Time Provider Department Center   5/6/2019  8:00 AM Anne Whitehead MD [facility]       Completed by: Anne Whitehead M.D., [facility]. 4/5/2019 8:19 AM.  This transcription uses voice recognition software, which may contain typographical errors.

## 2021-05-27 NOTE — TELEPHONE ENCOUNTER
Refill Approved    Rx renewed per Medication Renewal Policy. Medication was last renewed on 3/8/19.    Roxie Covarrubias, [facility] Triage/Med Refill 3/29/2019     Requested Prescriptions   Pending Prescriptions Disp Refills     citalopram (CELEXA) 20 MG tablet 30 tablet 1     Sig: Take 1 tablet (20 mg total) by mouth daily.    SSRI Refill Protocol  Passed - 3/28/2019 12:33 PM       Passed - PCP or prescribing provider visit in last year    Last office visit with prescriber/PCP: 3/8/2019 Anne Whitehead MD OR same dept: 3/8/2019 Anne Whitehead MD OR same specialty: 3/8/2019 Anne Whitehead MD  Last physical: 8/9/2018 Last MTM visit: Visit date not found   Next visit within 3 mo: Visit date not found  Next physical within 3 mo: Visit date not found  Prescriber OR PCP: Anne Whitehead MD  Last diagnosis associated with med order: 1. Moderate recurrent major depression (H)  - citalopram (CELEXA) 20 MG tablet; Take 1 tablet (20 mg total) by mouth daily.  Dispense: 30 tablet; Refill: 1    2. Seasonal affective disorder (H)  - citalopram (CELEXA) 20 MG tablet; Take 1 tablet (20 mg total) by mouth daily.  Dispense: 30 tablet; Refill: 1    3. Generalized anxiety disorder  - citalopram (CELEXA) 20 MG tablet; Take 1 tablet (20 mg total) by mouth daily.  Dispense: 30 tablet; Refill: 1    If protocol passes may refill for 12 months if within 3 months of last provider visit (or a total of 15 months).

## 2021-05-27 NOTE — PATIENT INSTRUCTIONS - HE
Date Time PHQ-9 score EMILIO-7 Current treatment Recommended treatment Follow up   2/7/19 0 wk 20 19 Therapy + no medications Add citalopram 10mg 2 weeks   3/8/19 4 wk 10  Therapy + citalopram 10mg (for two weeks) Increase citalopram to 20 mg daily 4 weeks    4/5/19 8 wk 6 10 Therapy + citalopram 20mg Increase citalopram to 40 mg daily  4 weeks    Next step: if needed, add in buspirone.

## 2021-05-27 NOTE — TELEPHONE ENCOUNTER
RN cannot approve Refill Request    RN can NOT refill this medication medication not on med list.       Roxie Covarrubias, [facility] Triage/Med Refill 4/10/2019    Requested Prescriptions   Pending Prescriptions Disp Refills     citalopram (CELEXA) 10 MG tablet [Pharmacy Med Name: CITALOPRAM  10MG  TAB] 30 tablet 0     Sig: TAKE 1 TABLET BY MOUTH  DAILY       SSRI Refill Protocol  Passed - 4/9/2019  2:29 PM        Passed - PCP or prescribing provider visit in last year     Last office visit with prescriber/PCP: 4/5/2019 Anne Whitehead MD OR same dept: 4/5/2019 Anne Whitehead MD OR same specialty: 4/5/2019 Anne Whitehead MD  Last physical: 8/9/2018 Last MTM visit: Visit date not found   Next visit within 3 mo: Visit date not found  Next physical within 3 mo: Visit date not found  Prescriber OR PCP: Anne Whitehead MD  Last diagnosis associated with med order: There are no diagnoses linked to this encounter.  If protocol passes may refill for 12 months if within 3 months of last provider visit (or a total of 15 months).

## 2021-05-28 NOTE — TELEPHONE ENCOUNTER
----- Message from Anne Whitehead MD sent at 5/6/2019  4:03 PM CDT -----  Please get records from [facility] for care from the last year. She was referred there for colpo in August. But we don't have the report from that...

## 2021-05-28 NOTE — TELEPHONE ENCOUNTER
Who is calling:  Patient   Reason for Call:  Her dog ate some of her medications.  The  Question is are any medications that patient takes an ER. None that writer noted.  No further questions at this time.    Date of last appointment with primary care: NA  Okay to leave a detailed message: No call back needed.

## 2021-05-28 NOTE — TELEPHONE ENCOUNTER
Fax cover sheet with written message [facility] at [phone] for medical records. Asking records to get fax to [phone].

## 2021-05-28 NOTE — PROGRESS NOTES
Office Visit  [facility]  Date of Service: 05/06/2019      Subjective   Melissa Eastman is a 23 y.o. female who presents for Follow-up    Melissa is little bit worried today because she realized that her NuvaRing is 9 days late for changing.  She has been having protected intercourse, using condoms, so she thinks risk of pregnancy is low.  Last intercourse was yesterday.    She has been having spotting with intercourse.  Sometimes it is like menstrual bleeding.  Seems to have bleeding even with minimal activity (manual penetration or penile vaginal intercourse).  Had an abnormal Pap smear with HGSIL last year.  Had colposcopy.  They discussed monitoring yearly.  Discussed possibility of LEEP if not improving.    She notices that she has been bruising more easily.  Worried about anemia.  No prior history of bleeding problems, easy bruising.  Not on blood thinners.    Has had dry mouth.  Worse on citalopram.  Drinking a lot of liquid and urinating frequently.  She thinks that she is urinating frequently because she is drinking a lot of liquid, not the other way around.  There is a family history of diabetes in grandparent.    Depression and anxiety are stable.  She is graduating from college.  Plans to keep the same job.  A lot of stress with transitions in her life right now.    Date Time PHQ-9 score EMILIO-7 Current treatment Recommended treatment Follow up   2/7/19 0 wk 20 19 Therapy + no medications Add citalopram 10mg 2 weeks   3/8/19 4 wk 10   Therapy + citalopram 10mg (for two weeks) Increase citalopram to 20 mg daily 4 weeks    4/5/19 8 wk 6 10 Therapy + citalopram 20mg  Increase citalopram to 40 mg daily  4 weeks   5/6/19 12 wk 6   5 Therapy + citalopram 40 mg  continue same  3 months               Objective   /70 (Patient Site: Left Arm, Patient Position: Sitting, Cuff Size: Adult Regular)   Pulse (!) 104   Resp 20   Wt 165 lb (74.8 kg)   LMP 12/02/2018   BMI 29.70 kg/m      She reports that she has never smoked. She has never used smokeless tobacco.    Gen: Alert, no apparent distress.  Heart: Regular rate and rhythm, no murmurs.  Lungs: Clear to auscultation bilaterally, no increased work of breathing.  Abdomen: Soft, non-tender, non-distended, bowel sounds normal.  Extremities: No clubbing, cyanosis, edema.  Genitourinary: Vulva, vagina are normal in appearance.  Cervix has a central area of beefy red erythema which is well demarcated.  Visible bleeding from punctate lesions.  Cervix is non-erythematous.  Vaginal discharge is normal.  No cervical motion tenderness or adnexal tenderness.  No adnexal fullness.   Skin: Scattered, small bruises less than 3 cm.  There is one larger, 4 cm bruise on the right distal posterior calf.    Results for orders placed or performed in visit on 05/06/19   Wet Prep, Vaginal   Result Value Ref Range    Yeast Result No yeast seen No yeast seen    Trichomonas No Trichomonas seen No Trichomonas seen    Clue Cells, Wet Prep No Clue cells seen No Clue cells seen   HM2(CBC w/o Differential)   Result Value Ref Range    WBC 4.2 4.0 - 11.0 thou/uL    RBC 5.48 (H) 3.80 - 5.40 mill/uL    Hemoglobin 15.5 12.0 - 16.0 g/dL    Hematocrit 46.8 35.0 - 47.0 %    MCV 85 80 - 100 fL    MCH 28.4 27.0 - 34.0 pg    MCHC 33.2 32.0 - 36.0 g/dL    RDW 12.3 11.0 - 14.5 %    Platelets 277 140 - 440 thou/uL    MPV 9.0 7.0 - 10.0 fL   Glycosylated Hemoglobin A1c   Result Value Ref Range    Hemoglobin A1c 5.4 3.5 - 6.0 %     No results found.    Assessment & Plan     1. Postcoital bleeding with history of abnormal Pap smear.  Exam most consistent with previous Pap smear finding.  Check gonorrhea, chlamydia, wet prep.  Referral back to OB/GYN for colposcopy, possible LEEP.  2. Late on birth control.  She will check a pregnancy test in 2 weeks.  Not concerned about pregnancy due to current use of condoms.  Prescription sent for Plan B.  3. Easy bruising.  No significant history of  easy bleeding or bruising.  CBC normal.  Encourage fruits and vegetables.  4. Dry mouth.  Possibly side effect of citalopram.  Continue hydration.  A1c is normal.  5. Depression and anxiety.  Primarily stress related symptoms at this point.  Continue current medication and therapy.  Recheck in 3 months.      Order Summary                                                      1. HSIL (high grade squamous intraepithelial lesion) on Pap smear of cervix  Ambulatory referral to Obstetrics / Gynecology   2. Bleeding after intercourse  Ambulatory referral to Obstetrics / Gynecology    HM2(CBC w/o Differential)    Chlamydia trachomatis & Neisseria gonorrhoeae, Amplified Detection    Wet Prep, Vaginal   3. Dry mouth  Glycosylated Hemoglobin A1c      No future appointments.    Completed by: Anne Whitehead M.D., [facility]. 5/6/2019 8:12 AM.  This transcription uses voice recognition software, which may contain typographical errors.

## 2021-05-30 NOTE — TELEPHONE ENCOUNTER
The patient is also on imipramine so there is risk for increased QT interval.    I will leave this for  for tomorrow, it looks like the patient has 2 pills left, for  to decide if she wants her to come in for an EKG, or go ahead and fill this.    Let the mom know and then forward this to

## 2021-05-30 NOTE — TELEPHONE ENCOUNTER
Called and left message for patients mother to confirm that she received refill medication. Instructed her to call back if had questions or if refill not received.   Dedra Padilla

## 2021-05-30 NOTE — TELEPHONE ENCOUNTER
Medication Request  Medication name:     citalopram (CELEXA) 40 MG tablet 90 tablet 0 7/17/2019     Sig - Route: Take 1 tablet (40 mg total) by mouth daily. - Oral    Sent to pharmacy as: citalopram (CELEXA) 40 MG tablet    Notes to Pharmacy: Patient is due for med check appointment before next refill due    E-Prescribing Status: Receipt confirmed by pharmacy (7/17/2019  8:37 AM CDT)      Pharmacy Name and Location: [place] [id number]    Reason for request: The patients mom is requesting a weeks supply of the above prescription due to the script being ordered from a mail order pharmacy previously and the mail order pharmacy stating that the script will not be received until August 1st. The patient has only only two pills left.   When did you use medication last?:  7/23/2019  Patient offered appointment:  patient declined  Okay to leave a detailed message: yes

## 2021-05-30 NOTE — TELEPHONE ENCOUNTER
5/6/19 12 wk 6    5 Therapy + citalopram 40 mg  continue same  3 months     Pcp recommended 3 month follow up on 5/6/19.    Refill Approved    Rx renewed per Medication Renewal Policy. Medication was last renewed on 5.6.19.    Lisandra Babb, [facility] Triage/Med Refill 7/17/2019     Requested Prescriptions   Pending Prescriptions Disp Refills     citalopram (CELEXA) 40 MG tablet 90 tablet 0     Sig: Take 1 tablet (40 mg total) by mouth daily.       SSRI Refill Protocol  Passed - 7/17/2019  8:29 AM        Passed - PCP or prescribing provider visit in last year     Last office visit with prescriber/PCP: 5/6/2019 Anne Whitehead MD OR same dept: 5/6/2019 Anne Whitehead MD OR same specialty: 5/6/2019 Anne Whitehead MD  Last physical: 8/9/2018 Last MTM visit: Visit date not found   Next visit within 3 mo: Visit date not found  Next physical within 3 mo: Visit date not found  Prescriber OR PCP: Anne Whitehead MD  Last diagnosis associated with med order: 1. Moderate recurrent major depression (H)  - citalopram (CELEXA) 40 MG tablet; Take 1 tablet (40 mg total) by mouth daily.  Dispense: 90 tablet; Refill: 0    2. Generalized anxiety disorder  - citalopram (CELEXA) 40 MG tablet; Take 1 tablet (40 mg total) by mouth daily.  Dispense: 90 tablet; Refill: 0    If protocol passes may refill for 12 months if within 3 months of last provider visit (or a total of 15 months).

## 2021-05-31 ENCOUNTER — RECORDS - HEALTHEAST (OUTPATIENT)
Dept: ADMINISTRATIVE | Facility: CLINIC | Age: 26
End: 2021-05-31

## 2021-05-31 VITALS — WEIGHT: 180 LBS | BODY MASS INDEX: 31.89 KG/M2 | HEIGHT: 63 IN

## 2021-05-31 NOTE — TELEPHONE ENCOUNTER
Refill Approved    Rx renewed per Medication Renewal Policy. Medication was last renewed on 8/9/2018 for 12/4.  Last OV 5/6/2019  Azucena Root, [facility] Triage/Med Refill 8/20/2019     Requested Prescriptions   Pending Prescriptions Disp Refills     etonogestrel-ethinyl estradiol (NUVARING) 0.12-0.015 mg/24 hr vaginal ring [Pharmacy Med Name: NUVARING RING  VAGINAL RING]       Sig: INSERT INSERT RING INTO  VAGINA, LEAVE IN FOR 3  WEEKS, REMOVE AND REPLACE       Oral Contraceptives Protocol Passed - 8/19/2019 12:29 PM        Passed - Visit with PCP or prescribing provider visit in last 12 months      Last office visit with prescriber/PCP: 5/6/2019 Anne Whitehead MD OR same dept: 5/6/2019 Anne Whitehead MD OR same specialty: 5/6/2019 Anne Whitehead MD  Last physical: 8/9/2018 Last MTM visit: Visit date not found   Next visit within 3 mo: Visit date not found  Next physical within 3 mo: Visit date not found  Prescriber OR PCP: Anne Whitehead MD  Last diagnosis associated with med order: 1. Contraception management  - NUVARING 0.12-0.015 mg/24 hr vaginal ring [Pharmacy Med Name: NUVARING RING  VAGINAL RING]; INSERT INSERT RING INTO  VAGINA, LEAVE IN FOR 3  WEEKS, REMOVE AND REPLACE    If protocol passes may refill for 12 months if within 3 months of last provider visit (or a total of 15 months).

## 2021-06-01 VITALS — HEIGHT: 63 IN | WEIGHT: 163 LBS | BODY MASS INDEX: 28.88 KG/M2

## 2021-06-01 VITALS — HEIGHT: 63 IN | BODY MASS INDEX: 29.93 KG/M2 | WEIGHT: 168.9 LBS

## 2021-06-01 NOTE — TELEPHONE ENCOUNTER
Now using mail order pharmacy.  Filled per protocol.    Lisandra Babb, RN, [facility] Nurse Triage/Med Refills RN

## 2021-06-02 ENCOUNTER — RECORDS - HEALTHEAST (OUTPATIENT)
Dept: ADMINISTRATIVE | Facility: CLINIC | Age: 26
End: 2021-06-02

## 2021-06-02 VITALS — WEIGHT: 162 LBS | BODY MASS INDEX: 29.16 KG/M2

## 2021-06-02 VITALS — BODY MASS INDEX: 29.5 KG/M2 | HEIGHT: 63 IN | WEIGHT: 166.5 LBS

## 2021-06-02 VITALS — WEIGHT: 171 LBS | BODY MASS INDEX: 30.29 KG/M2

## 2021-06-02 VITALS — WEIGHT: 166 LBS | BODY MASS INDEX: 29.41 KG/M2

## 2021-06-02 NOTE — TELEPHONE ENCOUNTER
Called and inform patient, She stated it needed to go to the [facility] pharmacy not the [facility]. I instruct patient that she will need to call mail pharmacy and tranfers it over to them. Patient agree she will call mail order pharmacy.     DOD,   She also needed Carafate to be refilled as well. Medication pending for approval. Please advise.

## 2021-06-02 NOTE — TELEPHONE ENCOUNTER
Prescription was sent.  Not sure which pharmacy to send it to, so please verify with patient.  I will be out of the office tomorrow.

## 2021-06-02 NOTE — TELEPHONE ENCOUNTER
Medication Request  Medication name: Dicyclomine capsule  Pharmacy Name and Location: [facility]  Reason for request: Request received from patient's pharmacy via fax.  Medication is shown as historical on patient's med list.   When did you use medication last?:  Information not provided.   Patient offered appointment:  N/a  Okay to leave a detailed message: no

## 2021-06-02 NOTE — TELEPHONE ENCOUNTER
Patient's insurance will only cover for patient to take 1 per day and patient is taking 2 per day. I set medication up for 60mg po daily so insurance will cover. Medication is pending. Please review and sign

## 2021-06-02 NOTE — PROGRESS NOTES
Subjective:   Melissa Eastman is a 23 y.o. year old female who presents to clinic today for the following health issues:    Chief Complaint   Patient presents with     skin itching     on face-- itching-- 2-3 weeks. just using moisterizer, pt thought it was just dry skin.      2 to 3-week history of patch of pruritic rash on right eyelid and lower orbit, along with bilateral creases of skin or mouth.  She has used over-the-counter moisturizer to the areas with little improvement.  She does report intermittent bilateral ear pressure and watery eyes for which she has been taking Zyrtec for.  She denies prior history of eczema.         PMHX:   PAST MEDICAL HISTORY:  Patient Active Problem List   Diagnosis     Cannabis dependence, uncomplicated (H)     Irritable bowel syndrome     Overweight with body mass index (BMI) 25.0-29.9     Cataract, right     Ptosis of eyelid     Optic disc anomaly     Chronic low back pain     Not immune to hepatitis B virus     HSIL (high grade squamous intraepithelial lesion) on Pap smear of cervix     Moderate recurrent major depression (H)     Generalized anxiety disorder     Seasonal affective disorder (H)     Panic disorder without agoraphobia       CURRENT MEDICATIONS:  Current Outpatient Medications   Medication Sig Dispense Refill     cetirizine (ZYRTEC) 10 MG tablet Take 1 tablet (10 mg total) by mouth daily. 90 tablet 3     dicyclomine (BENTYL) 10 MG capsule Take 1 capsule (10 mg total) by mouth 2 (two) times a day as needed (abdominal cramping). 30 capsule 1     DULoxetine (CYMBALTA) 60 MG capsule Take 1 capsule (60 mg total) by mouth daily. 30 capsule 2     etonogestrel-ethinyl estradiol (NUVARING) 0.12-0.015 mg/24 hr vaginal ring Insert 1 each into the vagina every 28 days. Leave in for three weeks, remove for 1 week, repeat with fresh ring. 12 each 4     imipramine (TOFRANIL) 10 MG tablet Take 1 tablet (10 mg total) by mouth at bedtime. 90 tablet 3     methocarbamol  "(ROBAXIN) 750 MG tablet Take 1 tablet (750 mg total) by mouth daily as needed (for lower back pain). 30 tablet 3     sucralfate (CARAFATE) 1 gram tablet Take 1 tablet (1 g total) by mouth 2 (two) times a day. 180 tablet 3     DULoxetine (CYMBALTA) 30 MG capsule Take 2 capsules (60 mg total) by mouth daily. 180 capsule 0     ondansetron (ZOFRAN-ODT) 4 MG disintegrating tablet Take 1 tablet (4 mg total) by mouth every 12 (twelve) hours as needed for nausea. 30 tablet 3     Current Facility-Administered Medications   Medication Dose Route Frequency Provider Last Rate Last Dose     lidocaine 4%-epinephrine 0.05%-tetracaine 0.5% gel 3 mL (L.E.T.)  1 application Topical Once Ying Willis PA-C           ALLERGIES:  No Known Allergies           Objective:     Vitals:    10/26/19 1212   BP: 113/75   Patient Site: Right Arm   Patient Position: Sitting   Cuff Size: Adult Large   Pulse: 93   Resp: 18   SpO2: 98%   Weight: 194 lb (88 kg)   Height: 5' 2.5\" (1.588 m)     Body mass index is 34.92 kg/m .    General: No acute distress  HEENT: moist mucous membranes, pupils equal, round, reactive to light and accomodation, tympanic membranes are pearly grey bilaterally  Cardiovascular: regular rate and rhythm with no murmur  Pulmonary: clear to auscultation bilaterally with no wheeze  Skin:  2-3 cm patches dry right upper eyelid and right lateral infraorbital rash, 2 cm rash inferior mouth near mouth creases b/l  Psych: Appropriate affect, memory intact    No results found for this or any previous visit (from the past 24 hour(s)).    Assessment and Plan:   1. Eczematous patches  Of dry skin patches on face resemble eczematous rash.  Will treat with low-dose hydrocortisone, and recommended using thicker emollient such as Cetaphil to face given dry skin.  She was educated on avoiding the eyes.  Recommended she continue Zyrtec for suspected underlying allergies.  She will follow-up with Dr. Whitehead if not improving in the next week.   - " hydrocortisone 1 % cream; Apply to dry rash patches twice daily for 1 week, and then as needed  Dispense: 30 g; Refill: 0      Michelle Dunn DO

## 2021-06-02 NOTE — PROGRESS NOTES
Office Visit  [facility]  Date of Service: 10/03/2019      Nomi Eastman is a 23 y.o. female who presents for Medication Refill (wants STD check) and Back Pain (low back pain goes in to hip)    STD screen  No symptoms  Has had unprotected intercourse with partners.    Worsening back pain  30# gain - no obvious changes in diet or exercise.  Did start citalopram during that time.  Makes it hard to exercise  Stretching helps  Pain is located in the lower back, moderate severity, associated with sciatica in the right greater than left leg.  No weakness, changes in bowel or bladder, or saddle anesthesia    Depression and anxiety  Wondering about adding anxiety medication.  She has been discussing this with her therapist recently.  Frequent bouts of anxiety that is intrusive.  It makes it hard for her to do things, though she still does (for example, driving).    Objective   /80 (Patient Site: Left Arm, Patient Position: Sitting, Cuff Size: Adult Regular)   Pulse 100   Wt 193 lb (87.5 kg)   LMP 09/11/2019 (Exact Date)   BMI 34.74 kg/m     She reports that she has never smoked. She has never used smokeless tobacco.  Wt Readings from Last 6 Encounters:   10/03/19 193 lb (87.5 kg)   05/06/19 165 lb (74.8 kg)   04/05/19 162 lb (73.5 kg)   03/08/19 166 lb 8 oz (75.5 kg)   02/07/19 166 lb (75.3 kg)   01/29/19 171 lb (77.6 kg)     Gen: Alert, no apparent distress.  Heart: Regular rate and rhythm, no murmurs.  Lungs: Clear to auscultation bilaterally, no increased work of breathing.  Abdomen: Soft, non-tender, non-distended, bowel sounds normal.  Genitourinary: Vulva, vagina are normal in appearance.  Cervix is erythematous and mildly friable. no cervical motion tenderness or adnexal tenderness.  No adnexal fullness.   Extremities: No clubbing, cyanosis, edema.    Results for orders placed or performed in visit on 10/03/19   Wet Prep, Vaginal   Result Value Ref Range     Yeast Result No yeast seen No yeast seen    Trichomonas No Trichomonas seen No Trichomonas seen    Clue Cells, Wet Prep No Clue cells seen No Clue cells seen     No results found.    Assessment & Plan     1. Deression/anxiety/chronic lower back pain/weight gain.  Plan to switch to a depression medication that is a little bit more weight neutral.  We will taper off citalopram 40 mg by taking 20 mg daily for 1 week, then stopping.  Start duloxetine 30 mg daily once off citalopram.  Increase to 60 mg daily after 1 week.  After that, will monitor for 4 to 6 weeks.  If still having significant anxiety symptoms, consider increasing duloxetine or adding in buspirone.  2. STD exposure risk.  STD testing performed.  3. Abnormal Pap smear.  Patient has colposcopy scheduled with gynecology in December.  4. Chronic lower back pain.  Discussed weight management.  Start duloxetine (as above.  Referral made to physical therapy.      Order Summary                                                      1. Chronic low back pain, unspecified back pain laterality, unspecified whether sciatica present  DULoxetine (CYMBALTA) 30 MG capsule    Ambulatory referral to Physical Therapy   2. Contraception management  etonogestrel-ethinyl estradiol (NUVARING) 0.12-0.015 mg/24 hr vaginal ring   3. Moderate recurrent major depression (H)  citalopram (CELEXA) 40 MG tablet    DULoxetine (CYMBALTA) 30 MG capsule   4. Generalized anxiety disorder  citalopram (CELEXA) 40 MG tablet    DULoxetine (CYMBALTA) 30 MG capsule   5. Possible exposure to STD  Chlamydia trachomatis & Neisseria gonorrhoeae, Amplified Detection    Hepatitis B Surface Antibody (Anti-HBs)    Hepatitis B Surface Antigen (HBsAG)    Hepatitis C Antibody (Anti-HCV)    HIV Antigen/Antibody Screening [place]    Syphilis Screen, [place]    Wet Prep, Vaginal   6. Health care maintenance  Influenza, Seasonal Quad, PF =/> 6months   7. Panic disorder without agoraphobia  DULoxetine (CYMBALTA) 30  MG capsule   8. Seasonal affective disorder (H)  DULoxetine (CYMBALTA) 30 MG capsule      Future Appointments   Date Time Provider Department Center   10/16/2019  4:45 PM Isaías Paul, PT [facility]   12/13/2019 10:20 AM [facility]       Completed by: Anne Whitehead M.D., [facility]. 10/3/2019 9:39 AM.  This transcription uses voice recognition software, which may contain typographical errors.

## 2021-06-02 NOTE — TELEPHONE ENCOUNTER
Medication Question or Clarification  Who is calling: Pharmacy: [facility] - Fax received.   What medication are you calling about? (include dose and sig)    Disp Refills Start End    DULoxetine (CYMBALTA) 30 MG capsule 180 capsule 0 10/3/2019     Sig - Route: Take 2 capsules (60 mg total) by mouth daily. - Oral    Sent to pharmacy as: DULoxetine 30 mg capsule,delayed release (CYMBALTA)    E-Prescribing Status: Receipt confirmed by pharmacy (10/3/2019 10:05 AM CDT)      Who prescribed the medication?: Anne Whitehead MD   What is your question/concern?: Pharmacy Comments:  Patient's insurance will only cover for patient to take 1 per day and patient is taking 2 per day.  Please send new Rx for the 60 mg capsule to take 1 per day so insurance will cover it.  Pharmacy: [facility]  Okay to leave a detailed message?: No  Site CMT - Please call the pharmacy to obtain any additional needed information.

## 2021-06-03 ENCOUNTER — RECORDS - HEALTHEAST (OUTPATIENT)
Dept: ADMINISTRATIVE | Facility: CLINIC | Age: 26
End: 2021-06-03

## 2021-06-03 VITALS
SYSTOLIC BLOOD PRESSURE: 113 MMHG | OXYGEN SATURATION: 98 % | DIASTOLIC BLOOD PRESSURE: 75 MMHG | HEIGHT: 63 IN | HEART RATE: 93 BPM | BODY MASS INDEX: 34.38 KG/M2 | RESPIRATION RATE: 18 BRPM | WEIGHT: 194 LBS

## 2021-06-03 VITALS — BODY MASS INDEX: 29.7 KG/M2 | WEIGHT: 165 LBS

## 2021-06-03 VITALS
SYSTOLIC BLOOD PRESSURE: 120 MMHG | BODY MASS INDEX: 34.74 KG/M2 | WEIGHT: 193 LBS | DIASTOLIC BLOOD PRESSURE: 80 MMHG | HEART RATE: 100 BPM

## 2021-06-03 NOTE — PROGRESS NOTES
ASSESMENT AND PLAN:  Diagnoses and all orders for this visit:    Rash and nonspecific skin eruption  -     Ambulatory referral to Dermatology  -     predniSONE (DELTASONE) 20 MG tablet; Take 20 mg by mouth daily Take 3 tabs daily for 2 days, then 2 tabs daily for 2 days, then 1 tab daily for 2 days, then stop..  Dispense: 12 tablet; Refill: 0  She will try oral steroid taper dose.  Dermatology referral order placed due to duration of symptoms and patient's request.    This transcription uses voice recognition software, which may contain typographical errors.        SUBJECTIVE: Melissa Eastman is here to follow-up on rash on the face.  She was seen at walk-in clinic on 10/26/2019 for pruritic rash on the face.  The rash started about a month ago.  No known history of eczema.  No known history of allergy to environment, food or animals. She was given topical steroid from walk-in clinic.  She is also using over-the-counter A and D cream and antibiotic ointment.  States the rash is worsening.  No associated shortness of breath, cough or wheezing.  Rashes only on the face.    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 Wears glasses for protection. Followed at [facility] ophtho every couple years. MRI every other year.       Patient Active Problem List   Diagnosis     Cannabis dependence, uncomplicated (H)     Irritable bowel syndrome     Overweight with body mass index (BMI) 25.0-29.9     Cataract, right     Ptosis of eyelid     Optic disc anomaly     Chronic low back pain     Not immune to hepatitis B virus     HSIL (high grade squamous intraepithelial lesion) on Pap smear of cervix     Moderate recurrent major depression (H)     Generalized anxiety disorder     Seasonal affective disorder (H)      "Panic disorder without agoraphobia       Allergies:  No Known Allergies    Social History     Tobacco Use   Smoking Status Never Smoker   Smokeless Tobacco Never Used   Tobacco Comment    no passive exposure       Review of systems otherwise negative except as listed in HPI.   Social History     Tobacco Use   Smoking Status Never Smoker   Smokeless Tobacco Never Used   Tobacco Comment    no passive exposure       OBJECTICE: /68 (Patient Site: Left Arm, Patient Position: Sitting, Cuff Size: Adult Large)   Pulse 82   Temp 98.2  F (36.8  C) (Oral)   Ht 5' 2.5\" (1.588 m)   Wt 203 lb 4 oz (92.2 kg)   LMP 09/11/2019 (Exact Date)   SpO2 98%   BMI 36.58 kg/m        GEN-alert,  in no apparent distress.  CV-regular rate and rhythm with no murmur.   RESP-lungs clear to auscultation .  SKIN-areas of dry skin around the eyes bilaterally with some erythema involving upper and lower eyelids.   Area of redness and dry skin around the mouth specially lateral corners.         Helder Palomo   11/19/2019   "

## 2021-06-03 NOTE — TELEPHONE ENCOUNTER
Patient was seen today by  and prescription was not sent electronically, was done on original paper prescription.  Rx was faxed to [facility] successfully.

## 2021-06-04 VITALS
HEART RATE: 88 BPM | BODY MASS INDEX: 37.85 KG/M2 | RESPIRATION RATE: 16 BRPM | SYSTOLIC BLOOD PRESSURE: 118 MMHG | DIASTOLIC BLOOD PRESSURE: 54 MMHG | TEMPERATURE: 99.1 F | WEIGHT: 212 LBS

## 2021-06-04 VITALS
DIASTOLIC BLOOD PRESSURE: 64 MMHG | HEIGHT: 63 IN | HEART RATE: 90 BPM | SYSTOLIC BLOOD PRESSURE: 100 MMHG | BODY MASS INDEX: 36.32 KG/M2 | WEIGHT: 205 LBS | TEMPERATURE: 99 F | RESPIRATION RATE: 16 BRPM

## 2021-06-04 VITALS
WEIGHT: 235 LBS | BODY MASS INDEX: 41.64 KG/M2 | HEART RATE: 80 BPM | DIASTOLIC BLOOD PRESSURE: 72 MMHG | TEMPERATURE: 98.6 F | HEIGHT: 63 IN | RESPIRATION RATE: 20 BRPM | OXYGEN SATURATION: 98 % | SYSTOLIC BLOOD PRESSURE: 122 MMHG

## 2021-06-04 VITALS
SYSTOLIC BLOOD PRESSURE: 108 MMHG | DIASTOLIC BLOOD PRESSURE: 68 MMHG | OXYGEN SATURATION: 98 % | HEART RATE: 82 BPM | BODY MASS INDEX: 36.01 KG/M2 | TEMPERATURE: 98.2 F | HEIGHT: 63 IN | WEIGHT: 203.25 LBS

## 2021-06-04 NOTE — PATIENT INSTRUCTIONS - HE
Week 1: duloxetine 30 mg  Week 2: escitalopram 10 mg  Week 3: escitalopram 10 mg  Week 4: escitalopram 20 mg

## 2021-06-04 NOTE — TELEPHONE ENCOUNTER
Type of referral:  Mental health  What provider or facility are you being referred to?  [facility]  Do you have an appointment scheduled? Yes, 1/28 @ 520pm  What is your question?  Clinic wanted to let you know its scheduled   Okay to leave a detailed message: no need to call anyone back

## 2021-06-04 NOTE — PROGRESS NOTES
"Office Visit  [facility]  Date of Service: 12/16/2019      Subjective   Melissa Eastman is a 24 y.o. female who presents for Follow-up; stomach problem, med check, blood in stool,; and std check    Depression  \"I hate the duloxetine\"   Increase suicidal ideation -no active plan, but thinks about it a lot   libido abnormal -high libido at times that she does not have access, low libido at times when she is with a partner  Depression not better  Back pain was better, now worse again  Hard to get out of house  Sleep OK - wakes a couple times at night, some early mornign wakening  Depression: got worse  Anxiety: same  Citalopram was better  Still gaining weight    Back pain  Weight gain made it worse  Hasn't done exercises due to poor motivation  Still seeing chiro  Topical CBD seems helpful    Rash  Hand, face  Started beginning of October  Thought due to mascara, because it started on her right upper eyelid  Spread to both eyelids and around the mouth.  Then she got on her hand  Cortisone cream helped at first.  Oral prednisone made it go away mostly.  Still has some on the hands    Stomach Pain  Coming back again  Pain is in bilateral lower quadrants, feels like she is constipated.  Not constipated.  No extra gas.  Not sure if related to depression  Vomiting  Not this bad since a school  Symptoms have been worse since end of Nov, until now    Blood in stool  A couple of weeks ago - blood in stool - bright red - single episode  Had blood once before  Was constipated at the time and had to strain.  No pain at anus.  No black tarry stools. Sometimes green - grass green to dark green  Saw GI last year    FMLA  For back pain - to take off work or work at home, office visits for mental health, gyn visits    Wants STI screen  Had unprotected sex with partner.    Objective   /64 (Patient Site: Left Arm, Patient Position: Sitting, Cuff Size: Adult Large)   Pulse 90   Temp 99  F " "(37.2  C) (Oral)   Resp 16   Ht 5' 2.75\" (1.594 m)   Wt 205 lb (93 kg)   BMI 36.60 kg/m     She reports that she has never smoked. She has never used smokeless tobacco.    Gen: Alert, no apparent distress.  Heart: Regular rate and rhythm, no murmurs.  Lungs: Clear to auscultation bilaterally, no increased work of breathing.  Abdomen: Soft, non-tender, non-distended, bowel sounds normal.  Genitourinary: Vulva, vagina normal appearance.  Cervix has erythema, especially posteriorly..  No cervical motion tenderness or adnexal tenderness.  No adnexal fullness.   Extremities: No clubbing, cyanosis, edema.  Skin: There is a hyperpigmented macular lesion on the back of the left hand.    Results for orders placed or performed in visit on 12/16/19   Wet Prep, Vaginal   Result Value Ref Range    Yeast Result No yeast seen No yeast seen    Trichomonas No Trichomonas seen No Trichomonas seen    Clue Cells, Wet Prep No Clue cells seen No Clue cells seen     No results found.    Assessment & Plan     1. Moderate, recurrent major depression, generalized anxiety disorder, and seasonal affective disorder.  Symptoms have become worse since switching to duloxetine.  Patient wishes to go back to citalopram.  Recommended change to Lexapro to reduce side effects.  The patient will taper off of duloxetine by taking 30 mg daily for 1 week.  Then she will start the escitalopram 10 mg daily for 2 weeks, then increase to 20 mg daily.  Follow-up in 4 weeks.  Discussed suicidal ideation.  If any active plans or thoughts, she should go to the emergency room.  2. Rash.  Etiology unclear.  Mostly resolved now.  Referral to dermatology for further evaluation and treatment.  3. Abdominal pain, vomiting, single episode of blood in the stool.  Referral made back to GI.  Most likely represents a superimposed viral infection.  No further blood in the stool.  4. Possible exposure to STD, unprotected sex.  STI screening performed  5. Abnormal Pap " smear.  Colposcopy scheduled for January.      Order Summary                                                      1. Moderate recurrent major depression (H)  escitalopram oxalate (LEXAPRO) 10 MG tablet    DULoxetine (CYMBALTA) 30 MG capsule   2. Generalized anxiety disorder  escitalopram oxalate (LEXAPRO) 10 MG tablet    DULoxetine (CYMBALTA) 30 MG capsule   3. Seasonal affective disorder (H)  escitalopram oxalate (LEXAPRO) 10 MG tablet    DULoxetine (CYMBALTA) 30 MG capsule   4. Rash  Ambulatory referral to Dermatology   5. Irritable bowel syndrome, unspecified type  Ambulatory referral to Gastroenterology   6. Possible exposure to STD  Chlamydia trachomatis & Neisseria gonorrhoeae, Amplified Detection    Hepatitis C Antibody (Anti-HCV)    HIV Antigen/Antibody Screening [place]    Syphilis Screen, [place]    Wet Prep, Vaginal   7. Panic disorder without agoraphobia  DULoxetine (CYMBALTA) 30 MG capsule   8. Chronic low back pain, unspecified back pain laterality, unspecified whether sciatica present  DULoxetine (CYMBALTA) 30 MG capsule      Future Appointments   Date Time Provider Department Center   1/14/2020  4:40 PM Anne Whitehead MD [facility]       Completed by: Anne Whitehead M.D., [facility]. 12/16/2019 10:12 AM.  This transcription uses voice recognition software, which may contain typographical errors.

## 2021-06-04 NOTE — TELEPHONE ENCOUNTER
,   Pt. Called asking for  two referrals. She is asking for Gastroenterology for her IBS and also would like to see a psychiatrist  for medication management.  Please place referrals if you agree or if you need to see her let me know.  Thank you   Celine

## 2021-06-04 NOTE — TELEPHONE ENCOUNTER
Name of form/paperwork: JUAN ANTONIO  Have you been seen for this request: Yes  Do we have the form: Yes. Patient states this was previously filled out, but she was informed by her employer that question #4 was left blank. She is asking for this to be filled out and resubmitted. 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    When is form needed by: ASAP  How would you like the form returned: Fax  Fax Number: see form   Patient Notified form requests are processed in 3-5 business days: No  (If patient needs form sooner, please note that in this message.)  Okay to leave a detailed message? Yes

## 2021-06-04 NOTE — TELEPHONE ENCOUNTER
Psychiatry referral made.  There is a GI referral from 12/16/19. Let me know if you need a new referral for any reason.

## 2021-06-05 VITALS
DIASTOLIC BLOOD PRESSURE: 77 MMHG | HEART RATE: 102 BPM | BODY MASS INDEX: 42.85 KG/M2 | RESPIRATION RATE: 14 BRPM | WEIGHT: 240 LBS | TEMPERATURE: 97.8 F | SYSTOLIC BLOOD PRESSURE: 115 MMHG

## 2021-06-05 NOTE — PROGRESS NOTES
Office Visit  [facility]  Date of Service: 01/14/2020      Nomi Eastman is a 24 y.o. female who presents for Follow-up    Saw GI this morning.  For evaluation of her nausea and vomiting.  Their impression was that she had cyclic vomiting syndrome from chronic marijuana use.  The advised abstinence of marijuana and follow-up in a couple of months.    Patient continues to have significant symptoms of her depression.  She is seeing psychiatrist on 1/28/2020.  She continues to see her therapist weekly.  The escitalopram that she was on made her very sleepy.  She was needed to sleep about 10 hours per night.    Patient finds it difficult to work and go to school at times due to nausea and vomiting, stomach cramps, numbness in the hands and feet.  She did get sweaty and shaky when she gets the nausea.    From her depression, she has mood irritability, feels overwhelmed around other people, finds it very stressful to work around other people, cries, has fatigue, has poor energy.    Her back pain prevents her from sitting or standing for long prolonged periods of time.  She feels much better she is able to lay down at will.  She finds it when she works at home she can lay down when she needs to and work on her bed or stretch when she needs to.      Objective   /54 (Patient Site: Left Arm, Patient Position: Sitting, Cuff Size: Adult Large)   Pulse 88   Temp 99.1  F (37.3  C) (Oral)   Resp 16   Wt 212 lb (96.2 kg)   BMI 37.85 kg/m     She reports that she has never smoked. She has never used smokeless tobacco.    Gen: Alert, no apparent distress.  Heart: Regular rate and rhythm, no murmurs.  Lungs: Clear to auscultation bilaterally, no increased work of breathing.  Abdomen: Soft, non-tender, non-distended, bowel sounds normal.  Extremities: No clubbing, cyanosis, edema.    Results for orders placed or performed in visit on 12/16/19   Chlamydia trachomatis &  Neisseria gonorrhoeae, Amplified Detection   Result Value Ref Range    Chlamydia trachomatis, Amplified Detection Negative Negative    Neisseria gonorrhoeae, Amplified Detection Negative Negative   Wet Prep, Vaginal   Result Value Ref Range    Yeast Result No yeast seen No yeast seen    Trichomonas No Trichomonas seen No Trichomonas seen    Clue Cells, Wet Prep No Clue cells seen No Clue cells seen   Hepatitis C Antibody (Anti-HCV)   Result Value Ref Range    Hepatitis C Ab Negative Negative   HIV Antigen/Antibody Screening Cascade   Result Value Ref Range    HIV Antigen / Antibody Negative Negative   Syphilis Screen, Cascade   Result Value Ref Range    Treponema Antibody (Syphilis) Negative Negative     No results found.    Assessment & Plan     1. Depression/anxiety.  Citalopram 20 mg prescribed.  Has visit with psychiatry pending.  They will take over her mental health care medications.  Continue to see therapist weekly.  2. Needs note for work, describing current symptoms and why they affect her ability to work.  History was taken today for documentation.      Order Summary                                                      1. Moderate recurrent major depression (H)  escitalopram oxalate (LEXAPRO) 20 MG tablet   2. Generalized anxiety disorder  escitalopram oxalate (LEXAPRO) 20 MG tablet   3. Seasonal affective disorder (H)  escitalopram oxalate (LEXAPRO) 20 MG tablet   4. Panic disorder without agoraphobia  escitalopram oxalate (LEXAPRO) 20 MG tablet      No future appointments.    Completed by: Anne Whitehead M.D., [facility]. 1/20/2020 4:53 PM.  This transcription uses voice recognition software, which may contain typographical errors.

## 2021-06-06 NOTE — TELEPHONE ENCOUNTER
citalopram (CELEXA) 10 MG tablet [[id number]]     Electronically signed by: Lyla Butler DO on 04/10/19 1301 Status: Discontinued   Ordering user: Lyla Butler DO 04/10/19 1301 Authorized by: Lyla Butler DO   Frequency:  04/10/19 - 05/06/19  Discontinued by: Anne Whitehead MD 05/06/19 0841

## 2021-06-07 NOTE — TELEPHONE ENCOUNTER
Requested Prescriptions     Pending Prescriptions Disp Refills     escitalopram oxalate (LEXAPRO) 10 MG tablet [Pharmacy Med Name: ESCITALOPRAM  10MG  TAB] 90 tablet 0     Sig: TAKE 1 TABLET BY MOUTH  DAILY       Brissa Sotomayor

## 2021-06-08 NOTE — TELEPHONE ENCOUNTER
Who is calling:    Mom    Reason for Call:    Calling to check on status of med:  Lexapro 20 mg  Lexapro 10 mg  Mom informed script sent to pharmacy and advise parent CMA will call to confirm RX. Mom verb understanding.  CMA call pharmacy and gave verbal orders disp#90 2 refills.    Date of last appointment with primary care: 01/14/2020    Okay to leave a detailed message: Yes

## 2021-06-08 NOTE — TELEPHONE ENCOUNTER
Refill Approved    Rx renewed per Medication Renewal Policy. Medication was last renewed on 1/14/20.    Roxie Covarrubias, [facility] Connection Triage/Med Refill 5/13/2020     Requested Prescriptions   Pending Prescriptions Disp Refills     escitalopram oxalate (LEXAPRO) 20 MG tablet 30 tablet 2     Sig: Take 1 tablet (20 mg total) by mouth daily.       SSRI Refill Protocol  Passed - 5/13/2020  8:41 AM        Passed - PCP or prescribing provider visit in last year     Last office visit with prescriber/PCP: 1/14/2020 Anne Whitehead MD OR same dept: 1/14/2020 Anne Whitehead MD OR same specialty: 1/14/2020 Anne Whitehead MD  Last physical: 8/9/2018 Last MTM visit: Visit date not found   Next visit within 3 mo: Visit date not found  Next physical within 3 mo: Visit date not found  Prescriber OR PCP: Anne Whitehead MD  Last diagnosis associated with med order: 1. Moderate recurrent major depression (H)  - escitalopram oxalate (LEXAPRO) 20 MG tablet; Take 1 tablet (20 mg total) by mouth daily.  Dispense: 30 tablet; Refill: 2    2. Generalized anxiety disorder  - escitalopram oxalate (LEXAPRO) 20 MG tablet; Take 1 tablet (20 mg total) by mouth daily.  Dispense: 30 tablet; Refill: 2    3. Seasonal affective disorder (H)  - escitalopram oxalate (LEXAPRO) 20 MG tablet; Take 1 tablet (20 mg total) by mouth daily.  Dispense: 30 tablet; Refill: 2    4. Panic disorder without agoraphobia  - escitalopram oxalate (LEXAPRO) 20 MG tablet; Take 1 tablet (20 mg total) by mouth daily.  Dispense: 30 tablet; Refill: 2    If protocol passes may refill for 12 months if within 3 months of last provider visit (or a total of 15 months).

## 2021-06-11 NOTE — TELEPHONE ENCOUNTER
"Left breast pain. For 5 days.  Patient asking to be seen     Everthing else is good, no other symptoms.    Patient needs to have an in patient appointment today.   Transferred to scheduling. For appointment.    Dorcas Gray RN  [facility] Triage/refill nurse      Reason for Disposition    Patient wants to be seen    Breast pain and cause is not known    Additional Information    Negative: Chest pain    Negative: Breastfeeding questions about baby    Negative: Breastfeeding questions about mother (breast symptoms or feeling sick)    Negative: Breastfeeding questions about mother's medicines and diet    Negative: Postpartum breast pain and swelling, not breastfeeding    Negative: Small spot, skin growth or mole    Negative: SEVERE breast pain and fever > 103 F (39.4 C)    Negative: Patient sounds very sick or weak to the triager    Negative: Breast looks infected (spreading redness, feels hot or painful to touch) and fever    Negative: Breast looks infected (spreading redness, feels hot or painful to touch) and no fever    Negative: Painful rash and multiple small blisters grouped together (i.e., dermatomal distribution or \"band\" or \"stripe\")    Negative: Cuts, burns, or bruises of breasts and suspicious history for the injury    Negative: Breast lump    Negative: Nipple discharge and bloody    Negative: Nipple is inverted (i.e., points inward)  (Exception: long-term physical characteristic, present for many years)    Negative: Dry flaking-peeling skin of nipple    Negative: Dimpling of skin of breast  (i.e., skin looks like the outside of an orange peel)    Negative: Change in shape or appearance of breast    Negative: Nipple discharge and not bloody (e.g., clear, white, yellow, brown, green)    Negative: Breast tenderness and fullness and pregnant    Protocols used: BREAST SYMPTOMS-A-OH      "

## 2021-06-11 NOTE — PROGRESS NOTES
Internal Medicine Office Visit  [facility]  Patient Name: Melissa Eastman  Patient Age: 24 y.o.  YOB: 1995  MRN: [id number]    Date of Visit: 2020  Reason for Office Visit:   Chief Complaint   Patient presents with     Breast Pain     left sided breast pain x 1 week. No lumps have been found.            Assessment / Plan / Medical Decision Makin. Breast pain, left  - US Breast Left Limited 1-3 Quadrants; Future  - US Breast Left Limited 1-3 Quadrants    Recommend symptom relief over-the-counter pain reliever certainly try some heat at 20-minute increments.  Did also recommend as patient does not wear support recommend obtaining good supportive bra    Orders Placed This Encounter   Procedures     US Breast Left Limited 1-3 Quadrants   Followup: Return in about 1 week (around 2020). earlier if needed.    Health Maintenance Review  Health Maintenance   Topic Date Due     PREVENTIVE CARE VISIT  2019     PAP FOLLOW-UP  07/15/2020     INFLUENZA VACCINE RULE BASED (1) 2020     ADVANCE CARE PLANNING  2023     TD 18+ HE  2026     DEPRESSION ACTION PLAN  Completed     HIV SCREENING  Completed     HEPATITIS B VACCINES  Completed     HPV VACCINES  Completed     TDAP ADULT ONE TIME DOSE  Completed         I have discontinued Melissa Eastman's methocarbamoL and DULoxetine. I am also having her maintain her cetirizine, ondansetron, etonogestreL-ethinyl estradioL, dicyclomine, sucralfate, escitalopram oxalate, and escitalopram oxalate. We will continue to administer lidocaine 4%-epinephrine 0.05%-tetracaine 0.5%.      HPI:  Melissa Eastman is a 24 y.o. year old who presents to the office today with chronic conditions including history of high risk sexual behavior, subcu reflux, excessive facial or body hair, optic nerve glioma, dysmenorrhea,Chronic marijuana use, depression, back pain.  Patient presents clinic today with a one-week history of  left breast pain without dimpling discharge or skin changes.  She notes no lumps or masses.  It is noted the patient does not wear any breast support.  She denies any family history of breast cancers.          Review of Systems- pertinent positive in bold:  Constitutional: Fever, chills, night sweats, fainting, weight change, fatigue, dizziness, sleeping difficulties, loud snoring/pauses in breathing  Eyes: change in vision, blurred or double vision, redness/eye pain  Ears, nose, mouth, throat: change in hearing, ear pain, hoarseness, difficulty swallowing, sores in the mouth or throat  Respiratory: shortness of breath, cough, bloody sputum, wheezing  Cardiovascular: chest pain, palpitations   Gastrointestinal: abdominal pain, heartburn/indigestion, nausea/vomiting, change in appetite, change in bowel habits, constipation or diarrhea, rectal bleeding/dark stools, difficulty swallowing  Urinary: painful urination, frequent urination, urinary urgency/incontinence, blood in urine/dark urine, nocturia (new or increasing), muscle cramps, swelling of hands, feet, ankles, leg pain/redness  Skin: change in moles/freckles, rash, nodules  Hematologic/lymphatic: swollen lymph glands, abnormal bruising/bleeding  Endocrine: excessive thirst/urination, cold or heat intolerance  Neurologic/emotional: worrisome memory change, numbness/tingling, anxiety, mood swings      Current Scheduled Meds:  Outpatient Encounter Medications as of 9/2/2020   Medication Sig Dispense Refill     cetirizine (ZYRTEC) 10 MG tablet Take 1 tablet (10 mg total) by mouth daily. 90 tablet 3     dicyclomine (BENTYL) 10 MG capsule Take 1 capsule (10 mg total) by mouth 2 (two) times a day as needed (abdominal cramping). 30 capsule 1     escitalopram oxalate (LEXAPRO) 10 MG tablet TAKE 1 TABLET BY MOUTH  DAILY 90 tablet 0     escitalopram oxalate (LEXAPRO) 20 MG tablet Take 1 tablet (20 mg total) by mouth daily. 90 tablet 2     etonogestrel-ethinyl estradiol  (NUVARING) 0.12-0.015 mg/24 hr vaginal ring Insert 1 each into the vagina every 28 days. Leave in for three weeks, remove for 1 week, repeat with fresh ring. 12 each 4     ondansetron (ZOFRAN-ODT) 4 MG disintegrating tablet Take 1 tablet (4 mg total) by mouth every 12 (twelve) hours as needed for nausea. 30 tablet 3     sucralfate (CARAFATE) 1 gram tablet Take 1 tablet (1 g total) by mouth 2 (two) times a day. 180 tablet 3     [DISCONTINUED] DULoxetine (CYMBALTA) 30 MG capsule Take 1 capsule (30 mg total) by mouth daily. 10 capsule 0     [DISCONTINUED] methocarbamol (ROBAXIN) 750 MG tablet Take 1 tablet (750 mg total) by mouth daily as needed (for lower back pain). 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 Wears glasses for protection. Followed at [facility] ophtho every couple years. MRI every other year.       Past Surgical History:   Procedure Laterality Date     RETINAL DETACHMENT SURGERY Right      Social History     Tobacco Use     Smoking status: Never Smoker     Smokeless tobacco: Never Used     Tobacco comment: no passive exposure   Substance Use Topics     Alcohol use: Yes     Comment: 4 16oz drinks per month.     Drug use: Yes     Frequency: 7.0 times per week     Types: Marijuana     Family History   Problem Relation Age of Onset     Acute Myocardial Infarction Maternal Grandmother 62     Diabetes Maternal Grandfather      Stroke Maternal Grandfather      Acute Myocardial Infarction Maternal Grandfather      Irritable bowel  "syndrome Maternal Grandfather      Nephrolithiasis Mother      Deep vein thrombosis Mother      Depression Mother      Anxiety disorder Mother      Irritable bowel syndrome Mother      Alcohol abuse Father      Diabetes Father      No Medical Problems Sister      Other Paternal Grandmother         Health history unknown     Other Paternal Grandfather         Health history unknown     Social History     Social History Narrative    Exercise: none right now.    Diet: nothing special. Excess sweets.    Avoids milk due to stomach upset.    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       Objective / Physical Examination:  Vitals:    09/02/20 1523   BP: 122/72   Pulse: 80   Resp: 20   Temp: 98.6  F (37  C)   SpO2: 98%   Weight: (!) 235 lb (106.6 kg)   Height: 5' 2.75\" (1.594 m)     Wt Readings from Last 3 Encounters:   09/02/20 (!) 235 lb (106.6 kg)   01/14/20 212 lb (96.2 kg)   12/16/19 205 lb (93 kg)     Body mass index is 41.96 kg/m .     General Appearance: Alert and oriented, cooperative, affect appropriate, speech clear, in no apparent distress  Head: Normocephalic, atraumatic  Eyes:  Conjunctivae clear and sclerae non-icteric  Breasts: breasts appear normal, no suspicious masses, no skin or nipple changes or axillary nodes.  Patient does report some left breast tenderness upon palpation and fibrosed breast tissue noted throughout  Lungs: Normal inspiratory and expiratory effort  Integumentary: Warm and dry. Without suspicious looking lesions  Neuro: Alert and oriented, follows commands appropriately.     No results found.  No results found for this or any previous visit (from the past 240 hour(s)).        Bessie Hunter, CNP  "

## 2021-06-12 NOTE — PROGRESS NOTES
Called and left message#1 for patient to return call to clinic. Okay to relay message. Has patient completed pap follow up somewhere else?

## 2021-06-12 NOTE — PROGRESS NOTES
Called and left message#3 for patient to return call to clinic. Okay to relay message.  Patient has upcoming appointment that was scheduled last week to discuss pap results

## 2021-06-12 NOTE — TELEPHONE ENCOUNTER
Refill Request  Did you contact pharmacy: Yes  Medication name:   Requested Prescriptions     Pending Prescriptions Disp Refills     etonogestreL-ethinyl estradioL (NUVARING) 0.12-0.015 mg/24 hr vaginal ring 12 each 4     Sig: Insert 1 each into the vagina every 28 days. Leave in for three weeks, remove for 1 week, repeat with fresh ring.     Who prescribed the medication:   BRADLEY PARRA  Requested Pharmacy: [facility]  Is patient out of medication: Yes  Patient notified refills processed in 3 business days:  yes  Okay to leave a detailed message: yes

## 2021-06-13 NOTE — PROGRESS NOTES
"Melissa Eastman is a 24 y.o. female who is being evaluated via a billable telephone visit.      The patient has been notified of following:     \"This telephone visit will be conducted via a call between you and your physician/provider. We have found that certain health care needs can be provided without the need for a physical exam.  This service lets us provide the care you need with a short phone conversation.  If a prescription is necessary we can send it directly to your pharmacy.  If lab work is needed we can place an order for that and you can then stop by our lab to have the test done at a later time.    Telephone visits are billed at different rates depending on your insurance coverage. During this emergency period, for some insurers they may be billed the same as an in-person visit.  Please reach out to your insurance provider with any questions.    If during the course of the call the physician/provider feels a telephone visit is not appropriate, you will not be charged for this service.\"    Patient has given verbal consent to a Telephone visit? Yes    What phone number would you like to be contacted at?[phone]     Patient would like to receive their AVS by AVS Preference: Mail a copy.    ____________________________    Virtual Visit - Telephone Encounter  [facility]  Family Medicine  Date of Service: 11/25/2020    Subjective:    Depression  Stress: work, Covid, seasonal depression.  Roomates are supportive. Applying to other jobs.    Pap smear  Pap smear was normal a couple of months ago at [facility]. Follow up is planned in 1 year, due to abnormal.  We don't have on file yet.    Weight gain  160 to 180 at baseline  Since last fall, has been steadily climbing  Not eating out as much, cooking more at home.  Maybe a little more sedentary  Has been on hormonal birth control for a long time, no change.    Objective:  Wt Readings from Last 6 Encounters:   09/02/20 (!) 235 lb " (106.6 kg)   01/14/20 212 lb (96.2 kg)   12/16/19 205 lb (93 kg)   11/19/19 203 lb 4 oz (92.2 kg)   10/26/19 194 lb (88 kg)   10/03/19 193 lb (87.5 kg)     not currently breastfeeding.   Speech is normal  Patient is calm  No results found.   Assessment & Plan:  1. Depression, anxiety, seasonal affective disorder. Coping well. Continue current medications and therapy.  2. Abnormal pap smear. Managed by gynecology. Will request most recent results.  3. Obesity. BMI >40. Rapid weight gain, still gaining. Referral to bariatric care to discuss etiology and management options.       Order Summary                                                      1. Morbid obesity (H)  Ambulatory referral to Bariatric Care: Surgical and Non-Surgical   2. HSIL (high grade squamous intraepithelial lesion) on Pap smear of cervix     3. Moderate recurrent major depression (H)     4. Generalized anxiety disorder     5. Seasonal affective disorder (H)        No future appointments.    Completed by: Anne Whitehead M.D., [facility]. 11/25/2020 8:06 AM.  This transcription uses voice recognition software, which may contain typographical errors.  ____________________________  Start call: 8:06 AM   End call: 8:21 AM     Phone call duration:  15 minutes
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
## 2021-06-14 NOTE — TELEPHONE ENCOUNTER
Requested Prescriptions     Pending Prescriptions Disp Refills     ondansetron (ZOFRAN-ODT) 4 MG disintegrating tablet 30 tablet 3     Sig: Take 1 tablet (4 mg total) by mouth every 12 (twelve) hours as needed for nausea.     dicyclomine (BENTYL) 10 MG capsule 30 capsule 1     Sig: Take 1 capsule (10 mg total) by mouth 2 (two) times a day as needed (abdominal cramping).      LAST OV: virtual visit with Anne Whitehead MD on 11/25/20  LAST RD: Odansetron last refill date- 2/7/19         Dicyclomine last refill date- 10/24/19    Symone Mann CMA

## 2021-06-14 NOTE — TELEPHONE ENCOUNTER
Requested Prescriptions     Pending Prescriptions Disp Refills     escitalopram oxalate (LEXAPRO) 10 MG tablet [Pharmacy Med Name: ESCITALOPRAM  10MG  TAB] 90 tablet 3     Sig: TAKE 1 TABLET BY MOUTH  DAILY

## 2021-06-14 NOTE — TELEPHONE ENCOUNTER
Medication Request  Medication name: ondansetron and dicyclomine cap  Requested Pharmacy: [facility]  Reason for request: refills  When did you use medication last?:  Unknown   Patient offered appointment:  N/A - electronic request  Okay to leave a detailed message: no

## 2021-06-15 NOTE — PROGRESS NOTES
Dear Dr. Anne Whitehead Md  [address] [place], [place] [id number]    Thank you for the opportunity to participate in the care of Ms. Melissa Eastman.    She is a 22 y.o. female who comes to the clinic with a chief complaint of excessive daytime sleepiness that is been going on for more than 5 years.  The patient denies any episodes of witnessed apnea or snoring.  She states that she is a morning person by nature.  She complains of difficulty staying asleep and has been developing a habit of waking up around 4:00 in the morning and not able to fall back to sleep.  She admits to surfing her phone while in bed while trying to initiate and maintain sleep.  She also has a history of sleepwalking during her teenage years.  She also complains of tossing and turning while trying to initiate sleep as well as slight discomfort during sleep.  The patient's review of systems is otherwise unremarkable.     Ideal Sleep-Wake Cycle(devoid of societal pressure):    Patient would try to initiate sleep at around 9 PM with a sleep latency of 15 minutes. The patient would have 1-3 awakening. Final wake up time is around 6 AM.    Past Medical History  Past Medical History:   Diagnosis Date     Dysmenorrhea      Excessive Facial Or Body Hair (Hirsutism)     Threads      High risk sexual behavior 12/29/2016        Past Surgical History  Past Surgical History:   Procedure Laterality Date     RETINAL DETACHMENT SURGERY Right         Meds  Current Outpatient Prescriptions   Medication Sig Dispense Refill     cetirizine (ZYRTEC) 5 MG tablet Take 5 mg by mouth daily.       etonogestrel-ethinyl estradiol (NUVARING) 0.12-0.015 mg/24 hr vaginal ring INSERT INSERT RING, LEAVE IN X 3 RING WEEKS, THEN OUT X1 WEEK 3 each 4     imipramine (TOFRANIL) 10 MG tablet        methocarbamol (ROBAXIN) 750 MG tablet Take 750 mg by mouth daily as needed.       sucralfate (CARAFATE) 1 gram tablet        No current facility-administered medications for this visit.          Allergies  Review of patient's allergies indicates no known allergies.     Social History  Social History     Social History     Marital status: Single     Spouse name: n/a     Number of children: 0     Years of education: BA     Occupational History     School      Masters Degree program at [employer]     Diversity plan      [employer] for [employer]     Social History Main Topics     Smoking status: Never Smoker     Smokeless tobacco: Never Used     Alcohol use Yes      Comment: 4 16oz drinks per month.     Drug use: 7.00 per week     Special: Marijuana     Sexual activity: Yes     Partners: Male     Birth control/ protection: Inserts      Comment: Ring     Other Topics Concern     Not on file     Social History Narrative    Exercise: none right now.    Diet: nothing special. Excess sweets.    Avoids milk due to stomach upset.    In graduate school at [employer] (2017).        Family History  Family History   Problem Relation Age of Onset     Acute Myocardial Infarction Maternal Grandmother 62     Diabetes Maternal Grandfather      Stroke Maternal Grandfather      Acute Myocardial Infarction Maternal Grandfather      Irritable bowel syndrome Maternal Grandfather      Nephrolithiasis Mother      Deep vein thrombosis Mother      Depression Mother      Anxiety disorder Mother      Irritable bowel syndrome Mother      Alcohol abuse Father      Diabetes Father      No Medical Problems Sister      Other Paternal Grandmother      Health history unknown     Other Paternal Grandfather      Health history unknown        Review of Systems:  Constitutional: Negative except as noted in HPI.   Eyes: Negative except as noted in HPI.   ENT: Negative except as noted in HPI.   Cardiovascular: Negative except as noted in HPI.   Respiratory: Negative except as noted in HPI.   Gastrointestinal: Negative except as noted in HPI.   Genitourinary: Negative except as noted in HPI.   Musculoskeletal: Negative except as noted in  "HPI.   Integumentary: Negative except as noted in HPI.   Neurological: Negative except as noted in HPI.   Psychiatric: Negative except as noted in HPI.   Endocrine: Negative except as noted in HPI.   Hematologic/Lymphatic: Negative except as noted in HPI.      STOP BANG 1/26/2018   Do you snore loudly (louder than talking or loud enough to be heard through closed doors)? 0   Do you often feel tired, fatigued, or sleepy during daytime? 1   Has anyone observed you stop breathing in your sleep? 0   Do you have or are you being treated for high blood pressure? 0   BMI more than 35 kg/m2 0   Age over 50 years old? 0   Neck circumference greater than 16 inches? 0   Gender male? 0   Total Score 1   Epworths Sleepiness Scale 1/26/2018   Sitting and reading 1   Watching TV 2   Sitting, inactive in a public place (e.g. a theatre or a meeting) 1   As a passenger in a car for an hour without a break 1   Lying down to rest in the afternoon when circumstances permit 0   Sitting and talking to someone 0   Sitting quietly after a lunch without alcohol 0   In a car, while stopped for a few minutes in traffic 0   Total score 5   Rooming 1/26/2018   Usual bedtime 9-10PM   Sleep Latency 15 minutes   Awakenings 1-3   Wake Up Time 5AM   Weekends 6AM   Energy Drinks No   Coffee No   Cola one   Difficulty falling asleep Yes   Difficulty staying asleep Yes   Excessive daytime tiredness No   Excessive daytime sleepiness No   Dozing off while driving No   Shift Worker No   Sleep Walking? Yes   Sleep Talking? Yes   Kicking or punching? No   Restless legs symptoms Yes       Physical Exam:  /60  Pulse 82  Resp 12  Ht 5' 3\" (1.6 m)  Wt 168 lb 14.4 oz (76.6 kg)  SpO2 98%  BMI 29.92 kg/m2  BMI:Body mass index is 29.92 kg/(m^2).   GEN: NAD, appropriate for age  Head: Normocephalic.  EYES: PERRLA, EOMI  ENT: Oropharynx is clear, mallampatti class 3+ airway. Uvula is intact  Nasal mucosa is moist without erythema  Neck : Thyroid is within " "normal limits. Neck 12\"  CV: Regular rate and rhythm, S1 & S2 positive.  LUNGS: Bilateral breathsounds heard.   ABDOMEN: Positive bowel sounds in all quadrants, soft, no rebound or guarding  MUSCULOSKELETAL: No leg swelling  SKIN: warm, dry, no rashes  Neurological: Alert, oriented to time, place, and person.  Psych: normal mood, normal affect     Labs/Studies:     Lab Results   Component Value Date    WBC 7.3 07/19/2016    HGB 13.8 07/19/2016    HCT 42.3 07/19/2016    MCV 84 07/19/2016     07/19/2016         Chemistry        Component Value Date/Time     07/19/2016 1512    K 3.5 07/19/2016 1512     07/19/2016 1512    CO2 26 07/19/2016 1512    BUN 9 07/19/2016 1512    CREATININE 0.81 07/19/2016 1512    GLU 83 07/19/2016 1512        Component Value Date/Time    CALCIUM 9.1 07/19/2016 1512    ALKPHOS 63 07/19/2016 1512    AST 14 07/19/2016 1512    ALT <6 07/19/2016 1512    BILITOT 0.4 07/19/2016 1512            No results found for: FERRITIN  Lab Results   Component Value Date    TSH 0.86 06/09/2015         Assessment and Plan:  In summary Melissa Eastman is a 22 y.o. year old female here for sleep disturbance.  1.  Hypersomnia  I informed the patient that I do not have enough evidence to proceed directly with a sleep study at this point in time.  However I would like to get a nocturnal oximetry study to look for abnormalities.  If positive then I will proceed with a sleep study.  2.  Sleep Maintenance Insomnia/Circadian Dysregulation  I educated the patient on the technique of stimulus control and would like her to implement this change tonight.  I will also give her handout on this topic as well as sleep hygiene.  I also advised the patient to fill out a sleep log for 6 weeks and bring log upon her return.  3.  Other sleep disturbance    Patient verbalized understanding of these issues, agrees with the plan and all questions were answered today. Patient was given an opportuntity to voice any other " symptoms or concerns not listed above. Patient did not have any other symptoms or concerns.     Follow-up in 6 weeks     Roger Viera DO  Board Certified in Internal Medicine and Sleep Medicine  [facility].    (Note created with Dragon voice recognition and unintended spelling errors and word substitutions may occur)

## 2021-06-16 NOTE — TELEPHONE ENCOUNTER
Telephone Encounter by Anne Whitehead MD at 7/25/2019  1:40 PM     Author: Anne Whitehead MD Service: -- Author Type: Physician    Filed: 7/25/2019  1:43 PM Encounter Date: 7/24/2019 Status: Addendum    : Anne Whitehead MD (Physician)    Related Notes: Original Note by Anne Whitehead MD (Physician) filed at 7/25/2019  1:40 PM       Micromedex does not indicate significant concern for QT prolongation. Will refill and forward to pharmacist to review. Epic believes that there IS an interaction.

## 2021-06-16 NOTE — PROGRESS NOTES
wetASSESSMENT/PLAN:  1. Abnormal cervical Papanicolaou smear, unspecified abnormal pap finding  Gynecologic Cytology (PAP Smear)    HPV High Risk DNA Cervical   2. Seasonal allergies     3. Moderate recurrent major depression (H)  escitalopram oxalate (LEXAPRO) 10 MG tablet    escitalopram oxalate (LEXAPRO) 20 MG tablet   4. Generalized anxiety disorder  escitalopram oxalate (LEXAPRO) 10 MG tablet    escitalopram oxalate (LEXAPRO) 20 MG tablet   5. Seasonal affective disorder (H)  escitalopram oxalate (LEXAPRO) 10 MG tablet    escitalopram oxalate (LEXAPRO) 20 MG tablet   6. Contraception management  etonogestreL-ethinyl estradioL (NUVARING) 0.12-0.015 mg/24 hr vaginal ring   7. Panic disorder without agoraphobia  escitalopram oxalate (LEXAPRO) 20 MG tablet   8. Irritable bowel syndrome  sucralfate (CARAFATE) 1 gram tablet   9. Vaginal discharge  Wet Prep, Vaginal    Chlamydia trachomatis & Neisseria gonorrhoeae, Amplified Detection       This is a 26 yo female with:  1.  Abnormal pap - has had previous cytology at [facility] - due for repeat - pap/HPV done/sent.  2.  Allergies - uses prn antihistamine  3.  Major depression//generalized anxiety/seasonal affective disorder/panic disorder - takes Escitalopram   4.  Contraception management - uses Nuvaring - refilled  5.  Irritable bowel syndrome - uses Sucralfate  6.  Vaginal discharge - will check wet prep, GC/Chlamydia    Return in about 6 months (around 10/23/2021) for Recheck.      Medications Discontinued During This Encounter   Medication Reason     sucralfate (CARAFATE) 1 gram tablet Reorder     escitalopram oxalate (LEXAPRO) 20 MG tablet Reorder     escitalopram oxalate (LEXAPRO) 10 MG tablet Reorder     etonogestreL-ethinyl estradioL (NUVARING) 0.12-0.015 mg/24 hr vaginal ring Reorder     There are no Patient Instructions on file for this visit.    Chief Complaint:  Chief Complaint   Patient presents with     Follow Up     abnormal pap        HPI:   Melissa Eastman is  a 25 y.o. female c/o  Had first dose Moderna 4/20; scheduled 5/15/2021 - at [facility] in [facility]  5/15 will be at [facility]        PMH:   Patient Active Problem List    Diagnosis Date Noted     Morbid obesity (H) 11/25/2020     Moderate recurrent major depression (H) 02/07/2019     Generalized anxiety disorder 02/07/2019     Seasonal affective disorder (H) 02/07/2019     Panic disorder without agoraphobia 02/07/2019     HSIL (high grade squamous intraepithelial lesion) on Pap smear of cervix 08/14/2018     Not immune to hepatitis B virus 10/20/2017     Irritable bowel syndrome 10/19/2017     Chronic low back pain 10/19/2017     Cannabis dependence, uncomplicated (H) 07/17/2016     Cataract, right 04/25/2005     Ptosis of eyelid 04/25/2005     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 Wears glasses for protection. Followed at [facility] ophtho every couple years. MRI every other year.       Past Surgical History:   Procedure Laterality Date     RETINAL DETACHMENT SURGERY Right      Social History     Socioeconomic History     Marital status: Single     Spouse name: n/a     Number of children: 0     Years of education: BA     Highest education level: Not on file   Occupational History     Occupation: School     Comment: Masters Degree program at [employer]     Occupation:      Comment: [employer] for [employer]   Social Needs     Financial resource strain: Not on file     Food insecurity     Worry: Not on file     Inability: Not on file     Transportation needs     Medical: Not on file     Non-medical: Not on file   Tobacco Use     Smoking status: Never Smoker     Smokeless tobacco: Never  Used     Tobacco comment: no passive exposure   Substance and Sexual Activity     Alcohol use: Yes     Comment: 4 16oz drinks per month.     Drug use: Yes     Frequency: 7.0 times per week     Types: Marijuana     Sexual activity: Yes     Partners: Male     Birth control/protection: Inserts     Comment: Nuva Ring   Lifestyle     Physical activity     Days per week: Not on file     Minutes per session: Not on file     Stress: Not on file   Relationships     Social connections     Talks on phone: Not on file     Gets together: Not on file     Attends Tenriism service: Not on file     Active member of club or organization: Not on file     Attends meetings of clubs or organizations: Not on file     Relationship status: Not on file     Intimate partner violence     Fear of current or ex partner: Not on file     Emotionally abused: Not on file     Physically abused: Not on file     Forced sexual activity: Not on file   Other Topics Concern     Not on file   Social History Narrative    Exercise: none right now.    Diet: nothing special. Excess sweets.    Avoids milk due to stomach upset.    In graduate school at [employer] (graduates 5/10/19)    Has a dog.     Family History   Problem Relation Age of Onset     Acute Myocardial Infarction Maternal Grandmother 62     Diabetes Maternal Grandfather      Stroke Maternal Grandfather      Acute Myocardial Infarction Maternal Grandfather      Irritable bowel syndrome Maternal Grandfather      Nephrolithiasis Mother      Deep vein thrombosis Mother      Depression Mother      Anxiety disorder Mother      Irritable bowel syndrome Mother      Alcohol abuse Father      Diabetes Father      No Medical Problems Sister      Other Paternal Grandmother         Health history unknown     Other Paternal Grandfather         Health history unknown       Meds:    Current Outpatient Medications:      cetirizine (ZYRTEC) 10 MG tablet, Take 1 tablet (10 mg total) by mouth daily., Disp: 90 tablet,  Rfl: 3     dicyclomine (BENTYL) 10 MG capsule, Take 1 capsule (10 mg total) by mouth 2 (two) times a day as needed (abdominal cramping)., Disp: 30 capsule, Rfl: 1     escitalopram oxalate (LEXAPRO) 10 MG tablet, Take 1 tablet (10 mg total) by mouth daily., Disp: 90 tablet, Rfl: 3     escitalopram oxalate (LEXAPRO) 20 MG tablet, Take 1 tablet (20 mg total) by mouth daily., Disp: 90 tablet, Rfl: 3     etonogestreL-ethinyl estradioL (NUVARING) 0.12-0.015 mg/24 hr vaginal ring, Insert 1 each into the vagina every 28 days. Leave in for three weeks, remove for 1 week, repeat with fresh ring., Disp: 4 each, Rfl: 3     ondansetron (ZOFRAN-ODT) 4 MG disintegrating tablet, Take 1 tablet (4 mg total) by mouth every 12 (twelve) hours as needed for nausea., Disp: 30 tablet, Rfl: 3     sucralfate (CARAFATE) 1 gram tablet, Take 1 tablet (1 g total) by mouth 2 (two) times a day., Disp: 180 tablet, Rfl: 3    Current Facility-Administered Medications:      lidocaine 4%-epinephrine 0.05%-tetracaine 0.5% gel 3 mL (L.E.T.), 1 application, Topical, Once, Ying Willis PA-C    Allergies:  No Known Allergies    ROS:  Pertinent positives as noted in HPI; otherwise 12 point ROS negative.      Physical Exam:  EXAM:  /77   Pulse (!) 102   Temp 97.8  F (36.6  C)   Resp 14   Wt (!) 240 lb (108.9 kg)   BMI 42.85 kg/m     Gen:  NAD, appears well, well-hydrated  HEENT:  TMs nl, oropharynx benign, nasal mucosa nl, conjunctiva clear  Neck:  Supple, no adenopathy, no thyromegaly, no carotid bruits, no JVD  Lungs:  Clear to auscultation bilaterally  Cor:  RRR no murmur  Abd:  Soft, nontender, BS+, no masses, no guarding or rebound, no HSM  PELVIC EXAM:External genitalia: normal  Vaginal mucosa normal  Vaginal discharge: white  Speculum exam shows a normal appearing cervix .   Bimanual exam: Cervix closed, firm, non tender  to motion.  Uterus  firm, regular, mobile, non tender to palpation. No adnexal masses or tenderness.   Extr:   Neg.  Neuro:  No asymmetry, Nl motor tone/strength, nl sensation, reflexes =, gait nl, nl coordination, CN intact,   Skin:  Warm/dry        Results:  Results for orders placed or performed in visit on 04/23/21   Wet Prep, Vaginal    Specimen: Genital   Result Value Ref Range    Yeast Result No yeast seen No yeast seen    Trichomonas No Trichomonas seen No Trichomonas seen    Clue Cells, Wet Prep Clue cells seen (!) No Clue cells seen   Chlamydia trachomatis & Neisseria gonorrhoeae, Amplified Detection    Specimen: Cervix, Endocervical; Body Fluid   Result Value Ref Range    Chlamydia trachomatis, Amplified Detection Negative Negative    Neisseria gonorrhoeae, Amplified Detection Negative Negative   Gynecologic Cytology (PAP Smear)   Result Value Ref Range    Case Report       Gynecologic Cytology Report                       Case: [id number]                                   Authorizing Provider:  Tyler,         Collected:           04/23/2021 [facility]                                     MD Temi                                                                 Ordering Location:     [facility]   Received:            04/23/2021 [address] [place] [address]                                                                  First Screen:          Louise Stallworth, [place]                                                                               (ASCP)                                                                       Pathologist:           Chinedu Appiah MD                                                        Specimen:    SUREPATH PAP, SCREENING, Endocervical/cervical                                             Interpretation (!) Negative for squamous intraepithelial lesion or malignancy.      Atypical squamous cells of undetermined significance    Result Flag Abnormal (!) Normal    Other Findings       Shift in vaginal silviano suggestive of bacterial vaginosis     Specimen Adequacy       Satisfactory for evaluation, endocervical/transformation zone component present    HPV Reflex? Yes regardless of result     HIGH RISK No     LMP/Menopause Date 4/18/2021     Abnormal Bleeding No     Pt Status na     Birth Control/Hormones Pill/patch/ring     Previous Normal/Date unknown     Prev Abn Date/Dx 1 year ago - ?diagnosis     Cervical Appearance normal    HPV High Risk DNA Cervical   Result Value Ref Range    HPV Source SurePath     HPV16 DNA Negative NEG    HPV18 DNA Negative NEG    Other HR HPV Negative NEG    Final Diagnosis SEE NOTES     Specimen Description Cervical Cells

## 2021-06-17 NOTE — PATIENT INSTRUCTIONS - HE
Patient Instructions by Anne Whitehead MD at 10/3/2019  9:20 AM     Author: Anne Whitehead MD Service: -- Author Type: Physician    Filed: 10/3/2019 10:03 AM Encounter Date: 10/3/2019 Status: Addendum    : Anne Whitehead MD (Physician)    Related Notes: Original Note by Anne Whitehead MD (Physician) filed at 10/3/2019  9:48 AM       Abnormal pap smear  Moderate and/or severe dysplasia, carcinoma in situ  Keep colposcopy appointment      Depression + Anxiety + Pain  Week #1: citalopram 20 mg daily (1/2 of 40 mg pill)  Week #2: duloxetine 30 mg daily  Week #3+: duloxetine 60 mg daily  Give it six weeks. If anxiety is still intrusive, will plan to add buspirone.

## 2021-06-17 NOTE — PROGRESS NOTES
ASSESSMENT / PLAN:  1. ASCUS of cervix with negative high risk HPV  Surgical Pathology Exam     This is a 26 yo female with recent abnormal pap smear - ASCUS with negative high risk HPV.  She reports multiple previous abnormal pap smears with colposcopy done previously at [facility] ([facility] (Dr. Godfrey).      We discussed HPV, cervix cancer, pap smear, colposcopy.    Gynecologic Cytology (PAP Smear): [id number]  Order: [id number]  Collected:  4/23/2021 08:40 Status:  Final result   Visible to patient:  Yes (MyChart) Dx:  Abnormal cervical Papanicolaou smear,...   Ref Range & Units    Interpretation   Abnormal   Atypical squamous cells of undetermined significance   Electronically signed by Chinedu Appiah MD on 4/30/2021 at 0936   Result Flag Normal AbnormalAbnormal     Other Findings   Shift in vaginal silviano suggestive of bacterial vaginosis      Specimen Adequacy  Satisfactory for evaluation, endocervical/transformation zone component present    HPV Reflex?  Yes regardless of result    HIGH RISK  No    LMP/Menopause Date  4/18/2021    Abnormal Bleeding  No    Pt Status  na    Birth Control/Hormones  Pill/patch/ring    Previous Normal/Date  unknown    Prev Abn Date/Dx  1 year ago - ?diagnosis    Cervical Appearance  normal    Resulting Agency  [facility]         Specimen Collected: 04/23/21 08:40 Last Resulted: 04/30/21 09:36             HPV High Risk DNA Cervical  Order: [id number] - Reflex for Order [id number]  Status:  Final result   Visible to patient:  Yes (MyChart) Dx:  Abnormal cervical Papanicolaou smear,...   Ref Range & Units 2wk ago   HPV Source  SurePath    HPV16 DNA NEG Negative    HPV18 DNA NEG Negative    Other HR HPV NEG Negative    Final Diagnosis  SEE NOTES    Comment: This patient's sample is negative for HPV DNA.               Colposcopy Procedure Note   Indications: Pap smear 1 months ago showed: no abnormalities and ASCUS with NEGATIVE high risk HPV. The prior pap showed no abnormalities and glandular cell  abnormality (CELESTINA). Prior cervical/vaginal disease: unknown. Prior cervical treatment: no treatment.   Procedure Details   The risks and benefits of the procedure and Verbal informed consent obtained.   Speculum placed in vagina and excellent visualization of cervix achieved, cervix swabbed x 3 with acetic acid solution.   Findings:   Cervix: acetowhite lesion(s) noted at 12 o'clock; Cervical biopsies taken at 12 o'clock.   Vaginal inspection: normal without visible lesions.   Vulvar colposcopy: normal mucosa without lesions.   Specimens: cervix biopsy - 12 oclock   Complications: none.   Plan: based on pathology results.

## 2021-06-17 NOTE — PATIENT INSTRUCTIONS - HE
"Patient Instructions by Riki Napier PA-C at 1/22/2020  8:10 AM     Author: Riki Napier PA-C Service: -- Author Type: Physician Assistant    Filed: 1/22/2020  9:12 AM Encounter Date: 1/22/2020 Status: Addendum    : Riki Napier PA-C (Physician Assistant)    Related Notes: Original Note by Riki Napier PA-C (Physician Assistant) filed at 1/22/2020  9:11 AM       You will be treated as if you have the flu. You are contagious until your fever is gone for 24 hours. Maintain good hand hygiene, cover your cough, and limit contact to prevent spreading the illness. Symptoms typically last 1-2 weeks.    Symptom management:  - Drink plenty of fluids and allow for plenty of rest  - Use tylenol or ibuprofen every 4-6 hours for fever/discomfort    Reasons to be seen immediately for re-evaluation:  - Have trouble breathing or are short of breath  - Feel pain or pressure in your chest or belly  - Get suddenly dizzy  - Feel confused  - Have severe vomiting    If no symptom improvement in 1 week, follow-up with your primary care provider.       What You Should Know About Influenza (Flu) Antiviral Drugs  Can flu illness be treated?  Yes. There are prescription medications called \"antiviral drugs\" that can be used to treat flu illness.  What are antiviral drugs?  Influenza antiviral drugs are prescription medicines (pills, liquid, or an inhaled powder) that fight against flu in your body. Antiviral drugs are not sold over-the-counter. You can only get them if you have a prescription from a health care provider. Antiviral drugs are different from antibiotics, which fight against bacterial infections.   What should I do if I think I have the flu?   If you get sick with flu, antiviral drugs are a treatment option. Check with your health care provider promptly if you are at high risk of serious flu complications (see the next page for full list of high risk factors) and you get flu symptoms. Flu symptoms can include fever, " cough, sore throat, runny or stuffy nose, body aches, headache, chills, and fatigue. Your doctor may prescribe antiviral drugs to treat your flu illness.  Should I still get a flu vaccine?  Yes. Antiviral drugs are not a substitute for getting a flu vaccine. While flu vaccine can vary in how well it works, a flu vaccine is the first and best way to prevent influenza. Antiviral drugs are a second line of defense to treat the flu if you get sick.  What are the benefits of antiviral drugs?  Antiviral treatment works best when started within two days of getting symptoms. Antiviral drugs can lessen fever and other symptoms, and shorten the time you are sick by about one day. They also can prevent serious flu complications, like pneumonia.   For people at high risk of serious flu complications, treatment with an antiviral drug can mean the difference between having a milder illness versus a very serious illness that could result in a hospital stay. For adults hospitalized with flu illness, some studies have reported that early antiviral treatment can reduce the risk of death.  What antiviral drugs are recommended this flu season?  There are four FDA-approved antiviral drugs recommended by CDC this season: oseltamivir phosphate (available as a generic version or under the trade name Tamiflu ), zanamivir (trade name Relenza ), peramivir (trade name Rapivab ), and baloxavir marboxil (trade name Xofluza ).   Oseltamivir is available as a pill or liquid and zanamivir is a powder that is inhaled. (Zanamivir is not recommended for people with breathing problems like asthma or COPD). Peramivir is given intravenously by a health care provider, and baloxavir is a pill given as a single dose by mouth.  What are the possible side effects of antiviral drugs?   Side effects vary for each medication. For example, the most common side effects for oseltamivir are nausea and vomiting, zanamivir can cause bronchospasm, and peramivir can  cause diarrhea.  Other less common side effects also have been reported. Your health care provider can give you more information about these drugs or you can check the Food and Drug Administration (FDA) website for specific information about antiviral drugs, including the 's package insert.   For more information, visit:www.cdc.gov/flu  or call 2-496-XSL-INFOCS HCVG-15-FLU-102 December 07, 2018   When should antiviral drugs be taken for treatment?  Studies show that flu antiviral drugs work best for treatment when they are started within two days of getting sick. However, starting them later can still be helpful, especially if the sick person is at high risk of serious flu complications or is very sick from the flu. Follow instructions for taking these drugs.  How long should antiviral drugs be taken?  To treat flu, oseltamvir and zanamivir are usually prescribed for 5 days, although people hospitalized with flu may need the medicine for longer than 5 days. Rapivab  is given intravenously for 15 to 30 minutes. Baloxavir is given in a single dose.  Can children take antiviral drugs?  Yes, though this varies by medication. Oseltamivir is recommended by CDC and the American Academy of Pediatrics (AAP) for early treatment of flu in people of any age, and for the prevention of flu in people 3 months and older. Zanamivir is recommended for early treatment of flu in people 7 years and older, and for the prevention of flu in people 5 years and older. Peramivir is recommended for early treatment in people 2 years and older. Baloxavir is recommended for early treatment of flu in people 12 years and older.  Can pregnant and breastfeeding women take antiviral drugs?  Oral oseltamivir is recommended for treatment of pregnant women with flu because compared to other recommended antiviral medications, it has the most studies available to suggest that it is safe and beneficial during pregnancy. Baloxavir is not  recommended for pregnant women or breastfeeding mothers.  Who should take antiviral drugs?  It's very important that antiviral drugs be used early to treat people who are very sick with flu (for example, people who are in the hospital) and people who are sick with flu who are at high risk of serious flu complications, either because of their age or because they have a high risk medical condition. Other people also may be treated with antiviral drugs by their health care provider this season. Most people who are otherwise healthy and get the flu, however, do not need to be treated with antiviral drugs.  The following is a list of all the health and age factors that are known to increase a person's risk of getting serious complications from the flu:   Asthma   Blood disorders (such as sickle cell disease)  Chronic lung disease (such as chronic obstructive pulmonary disease [COPD] and cystic fibrosis)  Endocrine disorders (such as diabetes mellitus)  People who are obese with a body mass index [BMI] of 40 or higher  Heart disease (such as congenital heart disease, congestive heart failure and coronary artery disease)   Kidney disorders  Liver disorders  Metabolic disorders (such as inherited metabolic disorders and mitochondrial disorders)  Neurologic and neurodevelopment conditions  People younger than 19 years old and on long-term aspirin or salicylate-containing medications  People with a weakened immune system due to disease (such as people with HIV or AIDS, or some cancers such as leukemia) or medications (such as those receiving chemotherapy or radiation treatment for cancer, or persons with chronic conditions requiring chronic corticosteroids or other drugs that suppress the immune system)    Other people at high risk from the flu:  Adults 65 years and older  Children younger than 2 years old1  Pregnant women and women up to 2 weeks after the end of pregnancy  American Indians and [place] Natives  People who live  in nursing homes and other long-term-care facilities    1 Although all children younger than 5 years old are considered at high risk for serious flu complications, the highest risk is for those younger than 2 years old, with the highest hospitalization and death rates among infants younger than 6 months old.  It is especially important that these people get a flu vaccine and seek medical treatment quickly if they get flu syptoms            Patient Education     Influenza (Adult)    Influenza is also called the flu. It is a viral illness that affects the air passages of your lungs. It is different from the common cold. The flu can easily be passed from one to person to another. It may be spread through the air by coughing and sneezing. Or it can be spread by touching the sick person and then touching your own eyes, nose, or mouth.  The flu starts 1 to 3 days after you are exposed to the flu virus. It may last for 1 to 2 weeks but many people feel tired or fatigued for many weeks afterward. You usually dont need to take antibiotics unless you have a complication. This might be an ear or sinus infection or pneumonia.  Symptoms of the flu may be mild or severe. They can include extreme tiredness (wanting to stay in bed all day), chills, fevers, muscle aches, soreness with eye movement, headache, and a dry, hacking cough.  Home care  Follow these guidelines when caring for yourself at home:    Avoid being around cigarette smoke, whether yours or other peoples.    Acetaminophen or ibuprofen will help ease your fever, muscle aches, and headache. Dont give aspirin to anyone younger than 18 who has the flu. Aspirin can harm the liver.    Nausea and loss of appetite are common with the flu. Eat light meals. Drink 6 to 8 glasses of liquids every day. Good choices are water, sport drinks, soft drinks without caffeine, juices, tea, and soup. Extra fluids will also help loosen secretions in your nose and  lungs.    Over-the-counter cold medicines will not make the flu go away faster. But the medicines may help with coughing, sore throat, and congestion in your nose and sinuses. Dont use a decongestant if you have high blood pressure.    Stay home until your fever has been gone for at least 24 hours without using medicine to reduce fever.  Follow-up care  Follow up with your healthcare provider, or as advised, if you are not getting better over the next week.  If you are age 65 or older, talk with your provider about getting a pneumococcal vaccine every 5 years. You should also get this vaccine if you have chronic asthma or COPD. All adults should get a flu vaccine every fall. Ask your provider about this.  When to seek medical advice  Call your healthcare provider right away if any of these occur:    Cough with lots of colored mucus (sputum) or blood in your mucus    Chest pain, shortness of breath, wheezing, or trouble breathing    Severe headache, or face, neck, or ear pain    New rash with fever    Fever of 100.4 F (38 C) or higher, or as directed by your healthcare provider    Confusion, behavior change, or seizure    Severe weakness or dizziness    You get a new fever or cough after getting better for a few days  Date Last Reviewed: 1/1/2017 2000-2017 [employer]. [address] [place] [address], [place] [address]. All rights reserved. This information is not intended as a substitute for professional medical care. Always follow your healthcare professional's instructions.
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
## 2021-06-20 NOTE — LETTER
Letter by Anne Whitehead MD at      Author: Anne Whitehead MD Service: -- Author Type: --    Filed:  Encounter Date: 1/14/2020 Status: Signed                    My Depression Action Plan  Name: Melissa Eastman   Date of Birth 1995  Date: 1/14/2020    My Doctor: Anne Whitehead MD   My Clinic: [facility]/OB  [address] [address]  [phone]          GREEN    ZONE   Good Control    What it looks like:     Things are going generally well. You have normal ups and downs. You may even feel depressed from time to time, but bad moods usually last less than a day.   What you need to do:  1. Continue to care for yourself (see self care plan)  2. Check your depression survival kit and update it as needed  3. Follow your physicians recommendations including any medication.  4. Do not stop taking medication unless you consult with your physician first.           YELLOW         ZONE Getting Worse    What it looks like:     Depression is starting to interfere with your life.     It may be hard to get out of bed; you may be starting to isolate yourself from others.    Symptoms of depression are starting to last most all day and this has happened for several days.     You may have suicidal thoughts but they are not constant.   What you need to do:     1. Call your care team. Your response to treatment will improve if you keep your care team informed of your progress. Yellow periods are signs an adjustment may need to be made.     2. Continue your self-care.  Just get dressed and ready for the day.  Don't give yourself time to talk yourself out of it.    3. Talk to someone in your support network.    4. Open up your depression Depression Self-Care Plan / Wellness kit.           RED    ZONE Medical Alert - Get Help    What it looks like:     Depression is seriously interfering with your life.     You may experience these or other symptoms: You cant get out of bed most days, cant work or  engage in other necessary activities, you have trouble taking care of basic hygiene, or basic responsibilities, thoughts of suicide or death that will not go away, self-injurious behavior.     What you need to do:  1. Call your care team and request a same-day appointment. If they are not available (weekends or after hours) call your local crisis line, emergency room or 911.            Self-Care Plan / Wellness Kit    Self-Care for Depression  Heres the deal. Your body and mind are really not as separate as most people think.  What you do and think affects how you feel and how you feel influences what you do and think. This means if you do things that people who feel good do, it will help you feel better.  Sometimes this is all it takes.  There is also a place for medication and therapy depending on how severe your depression is, so be sure to consult with your medical provider and/ or Behavioral Health Consultant if your symptoms are worsening or not improving.     In order to better manage my stress, I will:    Exercise  Get some form of exercise, every day. This will help reduce pain and release endorphins, the feel good chemicals in your brain. This is almost as good as taking antidepressants!  This is not the same as joining a gym and then never going! (they count on that by the way?) It can be as simple as just going for a walk or doing some gardening, anything that will get you moving.      Hygiene   Maintain good hygiene (get out of bed in the morning, make your bed, brush your teeth, take a shower, and get dressed like you were going to work, even if you are unemployed).  If your clothes don't fit try to get ones that do.    Diet  Strive to eat foods that are good for me, drink plenty of water, and avoid excessive sugar, caffeine, alcohol, and other mood-altering substances.  Some foods that are helpful in depression are: complex carbohydrates, B vitamins, flaxseed, fish or fish oil, fresh fruits and  vegetables.    Psychotherapy  Agree to participate in Individual Therapy (if recommended).    Medication  If prescribed medications, I agree to take them.  Missing doses can result in serious side effects.  I understand that drinking alcohol, or other illicit drug use, may cause potential side effects.  I will not stop my medication abruptly without first discussing it with my provider.    Staying Connected With Others  Stay in touch with my friends, family members, and my primary care provider/team.    Use your imagination  Be creative.  We all have a creative side; it doesnt matter if its oil painting, sand castles, or mud pies! This will also kick up the endorphins.    Witness Beauty  (AKA stop and smell the roses) Take a look outside, even in mid-winter. Notice colors, textures. Watch the squirrels and birds.     Service to others  Be of service to others.  There is always someone else in need.  By helping others we can get out of ourselves and remember the really important things.  This also provides opportunities for practicing all the other parts of the program.    Humor  Laugh and be silly!  Adjust your TV habits for less news and crime-drama and more comedy.    Control your stress  Try breathing deep, massage therapy, biofeedback, and meditation. Find time to relax each day.     Crisis Text Line  http://www.crisistextline.org    The Crisis Text Line serves anyone, in any type of crisis, providing access to free, 24/7 support and information via the medium people already use and trust:    Here's how it works:  1.  Text 805-907 from anywhere in the [place], anytime, about any type of crisis.  2.  A live, trained Crisis Counselor receives the text and responds quickly.  3.  The volunteer Crisis Counselor will help you move from a 'hot moment to a cool moment'.  My support system    Clinic Contact:  Phone number:    Contact 1:  Phone number:    Contact 2:  Phone number:    Faith/:  Phone  number:    Therapist:  Phone number:    [facility] crisis center:    Phone number:    Other community support:  Phone number:

## 2021-06-20 NOTE — LETTER
Letter by Tatiana Murphy MA at      Author: Tatiana Murphy MA Service: -- Author Type: --    Filed:  Encounter Date: 8/26/2020 Status: (Other)         Melissa Eastman  [address]  [place] [address]               August 31, 2020    Dear Melissa:    At [facility], your health and wellness are our primary concern. That is why we are following up on your Colposcopy from 01/15/20. Your provider had recommended that you have a Pap smear and Human Papillomavirus (HPV) test completed by 07/15/20.    Please call our office at [phone] to schedule an appointment for a Pap smear and HPV test at your earliest convenience.     If you have completed the appointment outside of the [facility] system, please have the records forwarded to our office. We will update your chart for your provider to review before your next annual wellness visit.     Thank you for choosing [facility]!      Sincerely,    Your [facility] Care Team//[facility]

## 2021-06-22 ENCOUNTER — AMBULATORY - HEALTHEAST (OUTPATIENT)
Dept: OBGYN | Facility: CLINIC | Age: 26
End: 2021-06-22

## 2021-06-23 NOTE — PATIENT INSTRUCTIONS - HE
1. Keep covered and clean.   2. Take antibiotic according to bottle directions with food.   3. Follow up if any redness, swelling, or pus.   4. Sand your hand raling.

## 2021-06-23 NOTE — PROGRESS NOTES
Chief Complaint   Patient presents with     Foreign Body in Skin     left index finger splinter x 1 day       HPI:  Melissa Eastman is a 23 y.o. female who presents today complaining of a splinter in the left index finger x 1 day.  Yesterday the patient was walking down stairs using a wooden handrail when she got a splinter on the rail.  Try to get it out, but was unable to do so.        History obtained from the patient.    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 Wears glasses for protection. Followed at [facility] ophtho every couple years. MRI every other year.         Social History     Tobacco Use     Smoking status: Never Smoker     Smokeless tobacco: Never Used   Substance Use Topics     Alcohol use: Yes     Comment: 4 16oz drinks per month.       Review of Systems   Skin:        Positive for splinter in the finger.   All other systems reviewed and are negative.      Vitals:    01/29/19 0723   BP: 100/68   Patient Site: Right Arm   Patient Position: Sitting   Cuff Size: Adult Regular   Pulse: 79   Resp: 16   Temp: 97.7  F (36.5  C)   TempSrc: Oral   SpO2: 97%   Weight: 171 lb (77.6 kg)        Physical Exam   Skin:   Small linear hyperchromic area approximately 0.2 cm in length on the pad of the left index finger between the PIP and the DIP.     Clinical Decision Making:  See separate procedure note for details.  She was started on prophylactic antibiotic. No current signs of infection.  At the end of the encounter, I discussed results, diagnosis, medications. Discussed red flags for immediate return to clinic/ER, as well as indications for follow up if no improvement. Patient understood and agreed to plan. Patient was stable for discharge.    1. Splinter  lidocaine 4%-epinephrine 0.05%-tetracaine 0.5% gel 3 mL (L.E.T.)    lidocaine 10 mg/mL (1 %) injection 1 mL    cephalexin (KEFLEX) 500 MG capsule    DISCONTINUED: lidocaine 4%-epinephrine 0.05%-tetracaine 0.5% gel 3 mL (L.E.T.)         Patient Instructions   1. Keep covered and clean.   2. Take antibiotic according to bottle directions with food.   3. Follow up if any redness, swelling, or pus.   4. Sand your hand raling.

## 2021-06-23 NOTE — PATIENT INSTRUCTIONS - HE
Depression + Anxiety    Continue seeing therapist    Start citalopram 10 mg    Follow up plan: 2, 4, 6, 9, 12 weeks.

## 2021-06-23 NOTE — PROGRESS NOTES
Patient ID: Melissa Eastman is a 23 y.o. female.      Foreign Body Removal  Date/Time: 1/29/2019 8:15 AM  Performed by: Ying Willis PA-C  Authorized by: Ying Willis PA-C   Body area: skin  General location: upper extremity  Location details: left index finger  Anesthesia: local infiltration    Anesthesia:  Local Anesthetic: lidocaine 1% without epinephrine and LET (lido,epi,tetracaine)  Anesthetic total (ml): .5 mL lidocaine, 1 mL LET.    Sedation:  Patient sedated: no    Patient restrained: no  Localization method: probed and visualized  Removal mechanism: forceps, irrigation and scalpel  Dressing: dressing applied  Tendon involvement: none  Depth: subcutaneous  Complexity: simple  1 objects recovered.  Objects recovered: splinter  Post-procedure assessment: foreign body removed  Patient tolerance: Patient tolerated the procedure well with no immediate complications

## 2021-06-23 NOTE — PROGRESS NOTES
Family Medicine Office Visit  Date of Service: 02/07/2019    Nomi Eastman is a 23 y.o. female who presents for Medcation check (GI medication to see if you can prescribe medication. ); Depression (Starting taking Ashwaganda for 1 week for  Increasing Depression Anxiety. ); and Labs (Check Hormone level and vitamin D and Vitamin b12 level for Depression. )    Depression + Anxiety  Melissa is here today for evaluation for starting medication for depression and anxiety.  She has been working with a therapist for a year.  Her therapist had diagnosed her with major depressive disorder and generalized anxiety disorder.  She also experiences symptoms of panic, but no agoraphobia.  Symptoms tend to worsen in the winter and improve in the summer.  Stressors include: Work and school.  She states that she is feeling at a low.  She and her therapist have discussed starting medication now.  She is having passive suicidal ideation, without active intent or plan.    Past medical history: She has never used medication in the past.  She took medicine for about 1 week, when she was in college.  This is a recurrent episode of depression.  She denies history of paula.    Social history: Current substance use: Marijuana.  Having troubles at work due to perceived behavioral problems because of depression.  Considering short-term disability.  Family history is significant for mother who has depression.  Her mom is currently on Wellbutrin, Abilify, amitriptyline, and gabapentin.  She is doing well.    She has been experiencing physical manifestations including muscle tension in the back and GI symptoms.  Currently using medication for muscle spasm (methocarbamol).  Carries a diagnosis of irritable bowel syndrome and takes medications for these.  Had a bad interaction with her gastroenterologist, making a comment that she felt was inappropriate and would like to see somebody different for her digestive health.    Time PHQ-9  total score Current treatment  Recommended treatment  0wk 20   Therapy + No medications Add citalopram 10 mg    Objective    Blood pressure 96/68, pulse 80, resp. rate 20, weight 166 lb (75.3 kg), not currently breastfeeding. Body mass index is 29.41 kg/m . Patient reports that  has never smoked. she has never used smokeless tobacco.    Melissa is neatly groomed and dressed in clothing appropriate for season.  Affect is depressed, mildly anxious.  Crying at points during visit.  Appears physically tense.  Eye contact is normal.  Insight is fair.  No evidence for abnormal ideation.  No psychomotor agitation or slowing.  Back: Inspection is normal.  There is mild to moderate muscle spasm from the neck through the lower back.    Results for orders placed or performed in visit on 08/09/18   Chlamydia trachomatis & Neisseria gonorrhoeae, Amplified Detection   Result Value Ref Range    Chlamydia trachomatis, Amplified Detection Negative Negative    Neisseria gonorrhoeae, Amplified Detection Negative Negative   Wet Prep, Vaginal   Result Value Ref Range    Yeast Result No yeast seen No yeast seen    Trichomonas No Trichomonas seen No Trichomonas seen    Clue Cells, Wet Prep No Clue cells seen No Clue cells seen   Thyroid Cascade   Result Value Ref Range    TSH 0.66 0.30 - 5.00 uIU/mL   Lipid Cascade   Result Value Ref Range    Cholesterol 217 (H) <=199 mg/dL    Triglycerides 92 <=149 mg/dL    HDL Cholesterol 72 >=50 mg/dL    LDL Calculated 127 <=129 mg/dL    Patient Fasting > 8hrs? Yes    Glucose   Result Value Ref Range    Glucose 77 70 - 99 mg/dL    Patient Fasting > 8hrs? Yes    HIV Antigen/Antibody Screening Cascade   Result Value Ref Range    HIV Antigen / Antibody Negative Negative   Syphilis Screen, Cascade   Result Value Ref Range    Treponema Antibody (Syphilis) Negative Negative   Hepatitis C Antibody (Anti-HCV)   Result Value Ref Range    Hepatitis C Ab Negative Negative   Hepatitis B Surface Antibody (Anti-HBs)    Result Value Ref Range    Hep B Surface Antibody Negative Negative   Gynecologic Cytology (PAP Smear)   Result Value Ref Range    Case Report       Gynecologic Cytology Report                       Case: [id number]                                   Authorizing Provider:  Anne Whitehead MD         Collected:           08/09/2018 0941              Ordering Location:     [facility]  Received:            08/09/2018 0941                                     Medicine/OB                                                                  First Screen:          Louise Stallworth, CT                                                                                (ASCP)                                                                       Pathologist:           Pantera Evangelista MD                                                       Specimen:    SUREPATH PAP, SCREENING, Endocervical/cervical                                             Interpretation       HSIL encompassing mod/severe dysplasia, CIS, CIN2, CIN3    Result Flag Abnormal (!) Normal    Specimen Adequacy       Satisfactory for evaluation, endocervical/transformation zone component present    HPV Reflex? Yes if Abnormal     HIGH RISK No     LMP/Menopause Date 5/20/18     Abnormal Bleeding No     Pt Status n/a     Birth Control/Hormones Pill/patch/ring     Previous Normal/Date 6/9/15     Prev Abn Date/Dx None     Cervical Appearance Normal    HPV High Risk DNA Cervical   Result Value Ref Range    HPV Source SurePath     HPV16 DNA Negative NEG    HPV18 DNA Negative NEG    Other HR HPV Positive (!) NEG    Final Diagnosis SEE NOTES     Specimen Description Cervical Cells      No results found.   Little interest or pleasure in doing things: Nearly every day  Feeling down, depressed, or hopeless: More than half the days  Trouble falling or staying asleep, or sleeping too much: More than half the days  Feeling tired or having little energy: More than half  the days  Poor appetite or overeating: Nearly every day  Feeling bad about yourself - or that you are a failure or have let yourself or your family down: More than half the days  Trouble concentrating on things, such as reading the newspaper or watching television: Nearly every day  Moving or speaking so slowly that other people could have noticed. Or the opposite - being so fidgety or restless that you have been moving around a lot more than usual: Several days  If you checked off any problems, how difficult have these problems made it for you to do your work, take care of things at home, or get along with other people?: Very difficult    PHQ-9 20    Assessment & Plan    1. Moderate recurrent major depression.  Seasonal affective disorder comorbid.  Continue seeing therapist.  Add citalopram 10 mg daily.  Check vitamin B12, vitamin D, and thyroid cascade.  Follow-up in 2 weeks.   Time PHQ-9 total score Current treatment  Recommended treatment   0wk 20   Therapy + No medications Add citalopram 10 mg  2. Generalized anxiety disorder.  3. Panic disorder without agoraphobia.  4. Irritable bowel syndrome.  Refilled patient's chronic medications.  Given a referral to [facility] gastroenterology.  5. Marijuana use disorder.  6. Muscle tension in back.  Discussed heat, massage, physical conditioning, as needed pain medication, and depression treatment.  7. Health maintenance: Immunized for hepatitis B and influenza.    Problem List Items Addressed This Visit     Irritable bowel syndrome    Relevant Medications    imipramine (TOFRANIL) 10 MG tablet    sucralfate (CARAFATE) 1 gram tablet    ondansetron (ZOFRAN-ODT) 4 MG disintegrating tablet    Other Relevant Orders    Ambulatory referral to Gastroenterology    Chronic low back pain    Moderate recurrent major depression (H) - Primary    Relevant Medications    imipramine (TOFRANIL) 10 MG tablet    Other Relevant Orders    Vitamin D, Total (25-Hydroxy)    Thyroid  Cascade    Vitamin B12    Generalized anxiety disorder    Relevant Medications    imipramine (TOFRANIL) 10 MG tablet    Seasonal affective disorder (H)    Relevant Medications    imipramine (TOFRANIL) 10 MG tablet    Panic disorder without agoraphobia    Relevant Medications    imipramine (TOFRANIL) 10 MG tablet         Future Appointments   Date Time Provider Department Center   2/22/2019  9:20 AM Anne Whitehead MD [facility]   3/8/2019 10:20 AM Anne Whitehead MD [facility]      Completed by:   Anne Whitehead M.D.  [facility]  2/7/2019 8:34 AM  This transcription uses voice recognition software, which may contain typographical errors.

## 2021-06-23 NOTE — TELEPHONE ENCOUNTER
Question following Office Visit  When did you see your provider: 2/7/2019  What is your question: Patient states the provider was going to send in a prescription for Citalopram 10 mg to her pharmacy. Pharmacy does not have the order.   Please address today per patient.  Okay to leave a detailed message: Yes

## 2021-06-24 NOTE — PROGRESS NOTES
"Office Visit  [facility]  Date of Service: 03/08/2019      Subjective   Melissa Eastman is a 23 y.o. female who presents for Follow-up    Melissa is here for follow up of depression. She had a hard time getting her citalopram filled.  It was sent to her mail order pharmacy, initially.  She has now been on citalopram 10 mg daily for a little over 2 weeks.  She is not having any significant side effects.  She feels like she is noticing benefit.      Her main symptom right now is insomnia.  She falls asleep easily, but then wakes in the morning and has a hard time getting back to sleep.  She is smoking marijuana to treat the insomnia.    Goes to bed 8pm    Wakes at Midnight - falls asleep easily    Wakes again 2-3 am - hard to fall asleep. Tries to fall asleep, if not asleep by 35-60 minutes, smokes marijuana.    Gets up at 5am    Adjunct therapies include  Therapy.  Good working relationship with her therapist.  Walking outside.  Walks when able.  Goes a good distance.  Light box.  Uses at work.    She is interested in massage therapy.  This is been helpful for her back pain in the past.  She would like a prescription in case her insurance will cover it.    Date Time PHQ-9 score Current treatment Recommended treatment Follow up   2/7/19 0 wk 20 Therapy + no medications Add citalopram 10mg 2 weeks   3/8/19 4 wk 10 Therapy + citalopram 10mg (for two weeks) Increase citalopram to 20 mg daily 4 weeks               Objective   /64 (Patient Site: Right Arm, Patient Position: Sitting, Cuff Size: Adult Regular)   Pulse 75   Temp 98.1  F (36.7  C) (Oral)   Ht 5' 2.5\" (1.588 m)   Wt 166 lb 8 oz (75.5 kg)   SpO2 98%   BMI 29.97 kg/m      reports that  has never smoked. she has never used smokeless tobacco.    General: Well groomed, affect normal.  Rate of speech is a little bit fast.  Appears slightly anxious, but improved compared to previous.  No evidence for abnormal ideation.  " Insight is good.  Thought process linear.    Results for orders placed or performed in visit on 02/15/19   Methylmalonic Acid (MMA), Quantitative   Result Value Ref Range    MMA Serum/Plasma, Vitamin B12 Status 0.16 0.00 - 0.40 umol/L   Homocysteine   Result Value Ref Range    Homocysteine 7 0 - 13 umol/L    Patient Fasting > 8hrs? Yes      No results found.    Assessment & Plan     Moderate recurrent major depression with seasonal affective disorder and generalized anxiety disorder.  Increase citalopram to 20 mg daily.  Recheck in 4 weeks.  Continue therapy, walks, light box use.  Prescription put in for massage therapy as well.      Order Summary                                                      1. Moderate recurrent major depression (H)  citalopram (CELEXA) 20 MG tablet   2. Seasonal affective disorder (H)  citalopram (CELEXA) 20 MG tablet   3. Generalized anxiety disorder  citalopram (CELEXA) 20 MG tablet   4. Chronic low back pain, unspecified back pain laterality, with sciatica presence unspecified  Massage therapy      Future Appointments   Date Time Provider Department Center   4/5/2019  8:00 AM Anne Whitehead MD [facility]       Completed by: Anne Whitehead M.D., [facility]. 3/8/2019 10:30 AM.  This transcription uses voice recognition software, which may contain typographical errors.

## 2021-06-24 NOTE — TELEPHONE ENCOUNTER
Who is calling:  Patient's mother, Nina  Reason for Call:  Caller was checking the status of patient's request for citalopram. Caller was informed the Rx was sent but to [facility]. Caller stated the Rx is supposed to go to [facility]. Please re-send the Rx to [facility] in [place]. Rx was pended with the correct pharmacy.  Date of last appointment with primary care: 2/7/19  Has the patient been recently seen:  Yes  Okay to leave a detailed message: No

## 2021-06-25 NOTE — TELEPHONE ENCOUNTER
Rx request responded to by other means.    Winston Koehler, RN BSN, [facility] Triage/Med Refill

## 2021-06-28 NOTE — PROGRESS NOTES
Progress Notes by Riki Napier PA-C at 1/22/2020  8:10 AM     Author: Riki Napier PA-C Service: -- Author Type: Physician Assistant    Filed: 1/22/2020 12:54 PM Encounter Date: 1/22/2020 Status: Signed    : Riki Napier PA-C (Physician Assistant)       Subjective:      Patient ID: Melissa Eastman is a 24 y.o. female.    Chief Complaint:    HPI     Melissa Eastman is a 24 y.o. female who presents today complaining of one day acute onset of Influenza like illness symptoms to include fever, dry nonproductive cough, sore throat, odynophagia, rhinorrhea, myalgias, arthralgias, headache and fatigue.      Patient had acute onset of all the above symptoms.    Patient has not had a seasonal influenza immunization.    Last dose of antipyretic.  None.  Temperature in the office is currently 98.4.    Anorexia: NO    Patient is taking fluids and is micturating.        Past Medical History:   Diagnosis Date   ? Dysmenorrhea    ? Esophageal reflux    ? Excessive Facial Or Body Hair (Hirsutism)     Threads    ? High risk sexual behavior 12/29/2016   ? 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 Wears glasses for protection. Followed at [facility] ophtho every couple years. MRI every other year.         Past Surgical History:   Procedure Laterality Date   ? RETINAL DETACHMENT SURGERY Right        Family History   Problem Relation Age of Onset   ? Acute Myocardial Infarction Maternal Grandmother 62   ? Diabetes Maternal Grandfather    ? Stroke Maternal Grandfather    ? Acute Myocardial Infarction Maternal Grandfather    ? Irritable bowel syndrome Maternal Grandfather    ? Nephrolithiasis Mother    ? Deep vein thrombosis Mother    ? Depression Mother    ? Anxiety disorder Mother    ? Irritable bowel syndrome Mother    ? Alcohol abuse Father    ? Diabetes Father    ? No Medical Problems Sister    ? Other Paternal Grandmother         Health history unknown   ? Other Paternal Grandfather          Health history unknown       Social History     Tobacco Use   ? Smoking status: Never Smoker   ? Smokeless tobacco: Never Used   ? Tobacco comment: no passive exposure   Substance Use Topics   ? Alcohol use: Yes     Comment: 4 16oz drinks per month.   ? Drug use: Yes     Frequency: 7.0 times per week     Types: Marijuana       Review of Systems  As above in HPI, otherwise balance of Review of Systems are negative.    Objective:     /69   Pulse 85   Temp 98.4  F (36.9  C)   Resp 20   SpO2 97%     Physical Exam     General: Patient is resting comfortably no acute distress is afebrile  HEENT: Head is normocephalic atraumatic   eyes are PERRL EOMI sclera anicteric   TMs are clear bilaterally  Throat is with mild pharyngeal wall erythema and no exudate  No cervical lymphadenopathy present  LUNGS: Clear to auscultation bilaterally  HEART: Regular rate and rhythm  Skin: Without rash non-diaphoretic      Assessment:     Procedures    The encounter diagnosis was Influenza-like illness.    Plan:     1. Influenza-like illness  oseltamivir (TAMIFLU) 75 MG capsule    DISCONTINUED: oseltamivir (TAMIFLU) 75 MG capsule       Patient all signs and symptoms of influenza will be empirically treated for influenza.  Indication for return was gone over.  We talked her risks and benefits of the treatment medication.  Questions were answered to patient's satisfaction before discharge.    Patient Instructions   You will be treated as if you have the flu. You are contagious until your fever is gone for 24 hours. Maintain good hand hygiene, cover your cough, and limit contact to prevent spreading the illness. Symptoms typically last 1-2 weeks.    Symptom management:  - Drink plenty of fluids and allow for plenty of rest  - Use tylenol or ibuprofen every 4-6 hours for fever/discomfort    Reasons to be seen immediately for re-evaluation:  - Have trouble breathing or are short of breath  - Feel pain or pressure in your chest or  "belly  - Get suddenly dizzy  - Feel confused  - Have severe vomiting    If no symptom improvement in 1 week, follow-up with your primary care provider.       What You Should Know About Influenza (Flu) Antiviral Drugs  Can flu illness be treated?  Yes. There are prescription medications called \"antiviral drugs\" that can be used to treat flu illness.  What are antiviral drugs?  Influenza antiviral drugs are prescription medicines (pills, liquid, or an inhaled powder) that fight against flu in your body. Antiviral drugs are not sold over-the-counter. You can only get them if you have a prescription from a health care provider. Antiviral drugs are different from antibiotics, which fight against bacterial infections.   What should I do if I think I have the flu?   If you get sick with flu, antiviral drugs are a treatment option. Check with your health care provider promptly if you are at high risk of serious flu complications (see the next page for full list of high risk factors) and you get flu symptoms. Flu symptoms can include fever, cough, sore throat, runny or stuffy nose, body aches, headache, chills, and fatigue. Your doctor may prescribe antiviral drugs to treat your flu illness.  Should I still get a flu vaccine?  Yes. Antiviral drugs are not a substitute for getting a flu vaccine. While flu vaccine can vary in how well it works, a flu vaccine is the first and best way to prevent influenza. Antiviral drugs are a second line of defense to treat the flu if you get sick.  What are the benefits of antiviral drugs?  Antiviral treatment works best when started within two days of getting symptoms. Antiviral drugs can lessen fever and other symptoms, and shorten the time you are sick by about one day. They also can prevent serious flu complications, like pneumonia.   For people at high risk of serious flu complications, treatment with an antiviral drug can mean the difference between having a milder illness versus a " very serious illness that could result in a hospital stay. For adults hospitalized with flu illness, some studies have reported that early antiviral treatment can reduce the risk of death.  What antiviral drugs are recommended this flu season?  There are four FDA-approved antiviral drugs recommended by CDC this season: oseltamivir phosphate (available as a generic version or under the trade name Tamiflu ), zanamivir (trade name Relenza ), peramivir (trade name Rapivab ), and baloxavir marboxil (trade name Xofluza ).   Oseltamivir is available as a pill or liquid and zanamivir is a powder that is inhaled. (Zanamivir is not recommended for people with breathing problems like asthma or COPD). Peramivir is given intravenously by a health care provider, and baloxavir is a pill given as a single dose by mouth.  What are the possible side effects of antiviral drugs?   Side effects vary for each medication. For example, the most common side effects for oseltamivir are nausea and vomiting, zanamivir can cause bronchospasm, and peramivir can cause diarrhea.  Other less common side effects also have been reported. Your health care provider can give you more information about these drugs or you can check the Food and Drug Administration (FDA) website for specific information about antiviral drugs, including the 's package insert.   For more information, visit:www.cdc.gov/flu  or call 0-269-OKG-INFOCS HCVG-15-FLU-102 December 07, 2018   When should antiviral drugs be taken for treatment?  Studies show that flu antiviral drugs work best for treatment when they are started within two days of getting sick. However, starting them later can still be helpful, especially if the sick person is at high risk of serious flu complications or is very sick from the flu. Follow instructions for taking these drugs.  How long should antiviral drugs be taken?  To treat flu, oseltamvir and zanamivir are usually prescribed for 5 days,  although people hospitalized with flu may need the medicine for longer than 5 days. Rapivab  is given intravenously for 15 to 30 minutes. Baloxavir is given in a single dose.  Can children take antiviral drugs?  Yes, though this varies by medication. Oseltamivir is recommended by CDC and the American Academy of Pediatrics (AAP) for early treatment of flu in people of any age, and for the prevention of flu in people 3 months and older. Zanamivir is recommended for early treatment of flu in people 7 years and older, and for the prevention of flu in people 5 years and older. Peramivir is recommended for early treatment in people 2 years and older. Baloxavir is recommended for early treatment of flu in people 12 years and older.  Can pregnant and breastfeeding women take antiviral drugs?  Oral oseltamivir is recommended for treatment of pregnant women with flu because compared to other recommended antiviral medications, it has the most studies available to suggest that it is safe and beneficial during pregnancy. Baloxavir is not recommended for pregnant women or breastfeeding mothers.  Who should take antiviral drugs?  It's very important that antiviral drugs be used early to treat people who are very sick with flu (for example, people who are in the hospital) and people who are sick with flu who are at high risk of serious flu complications, either because of their age or because they have a high risk medical condition. Other people also may be treated with antiviral drugs by their health care provider this season. Most people who are otherwise healthy and get the flu, however, do not need to be treated with antiviral drugs.  The following is a list of all the health and age factors that are known to increase a person's risk of getting serious complications from the flu:   Asthma   Blood disorders (such as sickle cell disease)  Chronic lung disease (such as chronic obstructive pulmonary disease [COPD] and cystic  fibrosis)  Endocrine disorders (such as diabetes mellitus)  People who are obese with a body mass index [BMI] of 40 or higher  Heart disease (such as congenital heart disease, congestive heart failure and coronary artery disease)   Kidney disorders  Liver disorders  Metabolic disorders (such as inherited metabolic disorders and mitochondrial disorders)  Neurologic and neurodevelopment conditions  People younger than 19 years old and on long-term aspirin or salicylate-containing medications  People with a weakened immune system due to disease (such as people with HIV or AIDS, or some cancers such as leukemia) or medications (such as those receiving chemotherapy or radiation treatment for cancer, or persons with chronic conditions requiring chronic corticosteroids or other drugs that suppress the immune system)    Other people at high risk from the flu:  Adults 65 years and older  Children younger than 2 years old1  Pregnant women and women up to 2 weeks after the end of pregnancy  American Indians and [place] Natives  People who live in nursing homes and other long-term-care facilities    1 Although all children younger than 5 years old are considered at high risk for serious flu complications, the highest risk is for those younger than 2 years old, with the highest hospitalization and death rates among infants younger than 6 months old.  It is especially important that these people get a flu vaccine and seek medical treatment quickly if they get flu syptoms            Patient Education     Influenza (Adult)    Influenza is also called the flu. It is a viral illness that affects the air passages of your lungs. It is different from the common cold. The flu can easily be passed from one to person to another. It may be spread through the air by coughing and sneezing. Or it can be spread by touching the sick person and then touching your own eyes, nose, or mouth.  The flu starts 1 to 3 days after you are exposed to the  flu virus. It may last for 1 to 2 weeks but many people feel tired or fatigued for many weeks afterward. You usually dont need to take antibiotics unless you have a complication. This might be an ear or sinus infection or pneumonia.  Symptoms of the flu may be mild or severe. They can include extreme tiredness (wanting to stay in bed all day), chills, fevers, muscle aches, soreness with eye movement, headache, and a dry, hacking cough.  Home care  Follow these guidelines when caring for yourself at home:    Avoid being around cigarette smoke, whether yours or other peoples.    Acetaminophen or ibuprofen will help ease your fever, muscle aches, and headache. Dont give aspirin to anyone younger than 18 who has the flu. Aspirin can harm the liver.    Nausea and loss of appetite are common with the flu. Eat light meals. Drink 6 to 8 glasses of liquids every day. Good choices are water, sport drinks, soft drinks without caffeine, juices, tea, and soup. Extra fluids will also help loosen secretions in your nose and lungs.    Over-the-counter cold medicines will not make the flu go away faster. But the medicines may help with coughing, sore throat, and congestion in your nose and sinuses. Dont use a decongestant if you have high blood pressure.    Stay home until your fever has been gone for at least 24 hours without using medicine to reduce fever.  Follow-up care  Follow up with your healthcare provider, or as advised, if you are not getting better over the next week.  If you are age 65 or older, talk with your provider about getting a pneumococcal vaccine every 5 years. You should also get this vaccine if you have chronic asthma or COPD. All adults should get a flu vaccine every fall. Ask your provider about this.  When to seek medical advice  Call your healthcare provider right away if any of these occur:    Cough with lots of colored mucus (sputum) or blood in your mucus    Chest pain, shortness of breath, wheezing, or  trouble breathing    Severe headache, or face, neck, or ear pain    New rash with fever    Fever of 100.4 F (38 C) or higher, or as directed by your healthcare provider    Confusion, behavior change, or seizure    Severe weakness or dizziness    You get a new fever or cough after getting better for a few days  Date Last Reviewed: 1/1/2017 2000-2017 [employer]. [address] [place] [address]. All rights reserved. This information is not intended as a substitute for professional medical care. Always follow your healthcare professional's instructions.

## 2021-07-03 NOTE — ADDENDUM NOTE
Addendum Note by Anne Whitehead MD at 2/7/2019  8:20 AM     Author: Anne Whitehead MD Service: -- Author Type: Physician    Filed: 2/11/2019 10:50 AM Encounter Date: 2/7/2019 Status: Signed    : Anne Whitehead MD (Physician)    Addended by: ANNE WHITEHEAD on: 2/11/2019 10:50 AM        Modules accepted: Orders

## 2021-07-03 NOTE — ADDENDUM NOTE
Addendum Note by Anne Whitehead MD at 10/3/2019  9:20 AM     Author: Anne Whitehead MD Service: -- Author Type: Physician    Filed: 10/3/2019  1:20 PM Encounter Date: 10/3/2019 Status: Signed    : Anne Whitehead MD (Physician)    Addended by: ANNE WHITEHEAD on: 10/3/2019 01:20 PM        Modules accepted: Orders

## 2021-07-04 NOTE — ADDENDUM NOTE
Addendum Note by Anne Whitehead MD at 4/15/2021 12:49 PM     Author: Anne Whitehead MD Service: -- Author Type: Physician    Filed: 4/15/2021 12:49 PM Encounter Date: 4/14/2021 Status: Signed    : Anne Whitehead MD (Physician)    Addended by: ANNE WHITEHEAD on: 4/15/2021 12:49 PM        Modules accepted: Orders

## 2021-07-25 ENCOUNTER — HEALTH MAINTENANCE LETTER (OUTPATIENT)
Age: 26
End: 2021-07-25

## 2021-09-19 ENCOUNTER — HEALTH MAINTENANCE LETTER (OUTPATIENT)
Age: 26
End: 2021-09-19

## 2022-01-28 ENCOUNTER — OFFICE VISIT (OUTPATIENT)
Dept: FAMILY MEDICINE | Facility: CLINIC | Age: 27
End: 2022-01-28
Payer: COMMERCIAL

## 2022-01-28 VITALS
BODY MASS INDEX: 45.36 KG/M2 | DIASTOLIC BLOOD PRESSURE: 82 MMHG | SYSTOLIC BLOOD PRESSURE: 122 MMHG | WEIGHT: 256 LBS | HEIGHT: 63 IN | HEART RATE: 89 BPM | RESPIRATION RATE: 18 BRPM

## 2022-01-28 DIAGNOSIS — E66.01 MORBID OBESITY (H): ICD-10-CM

## 2022-01-28 DIAGNOSIS — F12.20 CANNABIS DEPENDENCE, UNCOMPLICATED (H): ICD-10-CM

## 2022-01-28 DIAGNOSIS — M54.50 CHRONIC BILATERAL LOW BACK PAIN WITHOUT SCIATICA: ICD-10-CM

## 2022-01-28 DIAGNOSIS — Z13.6 SCREENING FOR CARDIOVASCULAR CONDITION: ICD-10-CM

## 2022-01-28 DIAGNOSIS — E28.2 PCOS (POLYCYSTIC OVARIAN SYNDROME): ICD-10-CM

## 2022-01-28 DIAGNOSIS — D33.3 BENIGN NEOPLASM OF CRANIAL NERVE (H): ICD-10-CM

## 2022-01-28 DIAGNOSIS — G89.29 CHRONIC BILATERAL LOW BACK PAIN WITHOUT SCIATICA: ICD-10-CM

## 2022-01-28 DIAGNOSIS — H26.9 CATARACT OF RIGHT EYE, UNSPECIFIED CATARACT TYPE: ICD-10-CM

## 2022-01-28 DIAGNOSIS — R52 WHOLE BODY PAIN: ICD-10-CM

## 2022-01-28 DIAGNOSIS — Z00.00 ROUTINE GENERAL MEDICAL EXAMINATION AT A HEALTH CARE FACILITY: Primary | ICD-10-CM

## 2022-01-28 DIAGNOSIS — Q14.2 CONGENITAL MALFORMATION OF OPTIC DISC: ICD-10-CM

## 2022-01-28 DIAGNOSIS — R87.613 HSIL (HIGH GRADE SQUAMOUS INTRAEPITHELIAL LESION) ON PAP SMEAR OF CERVIX: ICD-10-CM

## 2022-01-28 DIAGNOSIS — F33.1 MODERATE RECURRENT MAJOR DEPRESSION (H): ICD-10-CM

## 2022-01-28 PROBLEM — F41.1 GENERALIZED ANXIETY DISORDER: Status: ACTIVE | Noted: 2019-02-07

## 2022-01-28 PROBLEM — K58.9 IRRITABLE BOWEL SYNDROME: Status: ACTIVE | Noted: 2017-09-04

## 2022-01-28 PROBLEM — Z78.9 NOT IMMUNE TO HEPATITIS B VIRUS: Status: ACTIVE | Noted: 2017-10-20

## 2022-01-28 PROBLEM — F33.8 SEASONAL AFFECTIVE DISORDER (H): Status: ACTIVE | Noted: 2019-02-07

## 2022-01-28 PROBLEM — F41.0 PANIC DISORDER WITHOUT AGORAPHOBIA: Status: ACTIVE | Noted: 2019-02-07

## 2022-01-28 LAB
ALBUMIN SERPL-MCNC: 3.5 G/DL (ref 3.5–5)
ALP SERPL-CCNC: 108 U/L (ref 45–120)
ALT SERPL W P-5'-P-CCNC: 11 U/L (ref 0–45)
ANION GAP SERPL CALCULATED.3IONS-SCNC: 13 MMOL/L (ref 5–18)
AST SERPL W P-5'-P-CCNC: 17 U/L (ref 0–40)
B BURGDOR IGG+IGM SER QL: 0.57
BILIRUB SERPL-MCNC: 0.4 MG/DL (ref 0–1)
BUN SERPL-MCNC: 7 MG/DL (ref 8–22)
CALCIUM SERPL-MCNC: 9.6 MG/DL (ref 8.5–10.5)
CHLORIDE BLD-SCNC: 108 MMOL/L (ref 98–107)
CHOLEST SERPL-MCNC: 174 MG/DL
CK SERPL-CCNC: 85 U/L (ref 30–190)
CO2 SERPL-SCNC: 20 MMOL/L (ref 22–31)
CREAT SERPL-MCNC: 0.78 MG/DL (ref 0.6–1.1)
ERYTHROCYTE [DISTWIDTH] IN BLOOD BY AUTOMATED COUNT: 14.2 % (ref 10–15)
FASTING STATUS PATIENT QL REPORTED: ABNORMAL
GFR SERPL CREATININE-BSD FRML MDRD: >90 ML/MIN/1.73M2
GLUCOSE BLD-MCNC: 86 MG/DL (ref 70–125)
HBA1C MFR BLD: 5.5 % (ref 0–5.6)
HCT VFR BLD AUTO: 44.1 % (ref 35–47)
HDLC SERPL-MCNC: 48 MG/DL
HGB BLD-MCNC: 14.5 G/DL (ref 11.7–15.7)
IRON SATN MFR SERPL: 20 % (ref 20–50)
IRON SERPL-MCNC: 81 UG/DL (ref 42–175)
LDLC SERPL CALC-MCNC: 96 MG/DL
MCH RBC QN AUTO: 28 PG (ref 26.5–33)
MCHC RBC AUTO-ENTMCNC: 32.9 G/DL (ref 31.5–36.5)
MCV RBC AUTO: 85 FL (ref 78–100)
PLATELET # BLD AUTO: 273 10E3/UL (ref 150–450)
POTASSIUM BLD-SCNC: 3.9 MMOL/L (ref 3.5–5)
PROT SERPL-MCNC: 7 G/DL (ref 6–8)
RBC # BLD AUTO: 5.18 10E6/UL (ref 3.8–5.2)
SHBG SERPL-SCNC: 164 NMOL/L (ref 30–135)
SODIUM SERPL-SCNC: 141 MMOL/L (ref 136–145)
TIBC SERPL-MCNC: 399 UG/DL (ref 313–563)
TRANSFERRIN SERPL-MCNC: 319 MG/DL (ref 212–360)
TRIGL SERPL-MCNC: 151 MG/DL
TSH SERPL DL<=0.005 MIU/L-ACNC: 0.67 UIU/ML (ref 0.3–5)
VIT B12 SERPL-MCNC: 417 PG/ML (ref 213–816)
WBC # BLD AUTO: 6 10E3/UL (ref 4–11)

## 2022-01-28 PROCEDURE — 90686 IIV4 VACC NO PRSV 0.5 ML IM: CPT | Performed by: FAMILY MEDICINE

## 2022-01-28 PROCEDURE — 87624 HPV HI-RISK TYP POOLED RSLT: CPT | Performed by: FAMILY MEDICINE

## 2022-01-28 PROCEDURE — 84443 ASSAY THYROID STIM HORMONE: CPT | Performed by: FAMILY MEDICINE

## 2022-01-28 PROCEDURE — G0123 SCREEN CERV/VAG THIN LAYER: HCPCS | Performed by: FAMILY MEDICINE

## 2022-01-28 PROCEDURE — 80053 COMPREHEN METABOLIC PANEL: CPT | Performed by: FAMILY MEDICINE

## 2022-01-28 PROCEDURE — 83498 ASY HYDROXYPROGESTERONE 17-D: CPT | Performed by: FAMILY MEDICINE

## 2022-01-28 PROCEDURE — 83036 HEMOGLOBIN GLYCOSYLATED A1C: CPT | Performed by: FAMILY MEDICINE

## 2022-01-28 PROCEDURE — 82607 VITAMIN B-12: CPT | Performed by: FAMILY MEDICINE

## 2022-01-28 PROCEDURE — 85027 COMPLETE CBC AUTOMATED: CPT | Performed by: FAMILY MEDICINE

## 2022-01-28 PROCEDURE — 80061 LIPID PANEL: CPT | Performed by: FAMILY MEDICINE

## 2022-01-28 PROCEDURE — 96127 BRIEF EMOTIONAL/BEHAV ASSMT: CPT | Performed by: FAMILY MEDICINE

## 2022-01-28 PROCEDURE — 83540 ASSAY OF IRON: CPT | Performed by: FAMILY MEDICINE

## 2022-01-28 PROCEDURE — 86618 LYME DISEASE ANTIBODY: CPT | Performed by: FAMILY MEDICINE

## 2022-01-28 PROCEDURE — 87591 N.GONORRHOEAE DNA AMP PROB: CPT | Performed by: FAMILY MEDICINE

## 2022-01-28 PROCEDURE — 90471 IMMUNIZATION ADMIN: CPT | Performed by: FAMILY MEDICINE

## 2022-01-28 PROCEDURE — 84403 ASSAY OF TOTAL TESTOSTERONE: CPT | Performed by: FAMILY MEDICINE

## 2022-01-28 PROCEDURE — 99214 OFFICE O/P EST MOD 30 MIN: CPT | Mod: 25 | Performed by: FAMILY MEDICINE

## 2022-01-28 PROCEDURE — 82627 DEHYDROEPIANDROSTERONE: CPT | Performed by: FAMILY MEDICINE

## 2022-01-28 PROCEDURE — 36415 COLL VENOUS BLD VENIPUNCTURE: CPT | Performed by: FAMILY MEDICINE

## 2022-01-28 PROCEDURE — 84270 ASSAY OF SEX HORMONE GLOBUL: CPT | Performed by: FAMILY MEDICINE

## 2022-01-28 PROCEDURE — 82306 VITAMIN D 25 HYDROXY: CPT | Performed by: FAMILY MEDICINE

## 2022-01-28 PROCEDURE — 84466 ASSAY OF TRANSFERRIN: CPT | Performed by: FAMILY MEDICINE

## 2022-01-28 PROCEDURE — 87491 CHLMYD TRACH DNA AMP PROBE: CPT | Performed by: FAMILY MEDICINE

## 2022-01-28 PROCEDURE — 82550 ASSAY OF CK (CPK): CPT | Performed by: FAMILY MEDICINE

## 2022-01-28 PROCEDURE — 99395 PREV VISIT EST AGE 18-39: CPT | Mod: 25 | Performed by: FAMILY MEDICINE

## 2022-01-28 RX ORDER — ESCITALOPRAM OXALATE 20 MG/1
TABLET ORAL
COMMUNITY
Start: 2021-12-28 | End: 2022-12-06

## 2022-01-28 RX ORDER — MELOXICAM 15 MG/1
TABLET ORAL
COMMUNITY
Start: 2021-12-12 | End: 2022-01-28

## 2022-01-28 RX ORDER — CETIRIZINE HYDROCHLORIDE 10 MG/1
10 TABLET ORAL
COMMUNITY
End: 2023-12-15

## 2022-01-28 RX ORDER — ETONOGESTREL AND ETHINYL ESTRADIOL VAGINAL RING .015; .12 MG/D; MG/D
RING VAGINAL
COMMUNITY
End: 2022-01-28

## 2022-01-28 ASSESSMENT — MIFFLIN-ST. JEOR: SCORE: 1858.95

## 2022-01-28 ASSESSMENT — PATIENT HEALTH QUESTIONNAIRE - PHQ9
SUM OF ALL RESPONSES TO PHQ QUESTIONS 1-9: 8
10. IF YOU CHECKED OFF ANY PROBLEMS, HOW DIFFICULT HAVE THESE PROBLEMS MADE IT FOR YOU TO DO YOUR WORK, TAKE CARE OF THINGS AT HOME, OR GET ALONG WITH OTHER PEOPLE: NOT DIFFICULT AT ALL
SUM OF ALL RESPONSES TO PHQ QUESTIONS 1-9: 8
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
## 2022-01-29 LAB
C TRACH DNA SPEC QL PROBE+SIG AMP: NEGATIVE
N GONORRHOEA DNA SPEC QL NAA+PROBE: NEGATIVE

## 2022-01-29 ASSESSMENT — PATIENT HEALTH QUESTIONNAIRE - PHQ9: SUM OF ALL RESPONSES TO PHQ QUESTIONS 1-9: 8

## 2022-01-31 LAB
17OHP SERPL-MCNC: 15 NG/DL
DEPRECATED CALCIDIOL+CALCIFEROL SERPL-MC: 30 UG/L (ref 30–80)
DHEA-S SERPL-MCNC: 152 UG/DL (ref 35–430)

## 2022-02-01 LAB
TESTOST FREE SERPL-MCNC: 0.3 NG/DL
TESTOST SERPL-MCNC: 55 NG/DL (ref 8–60)

## 2022-02-02 LAB
HUMAN PAPILLOMA VIRUS 16 DNA: NEGATIVE
HUMAN PAPILLOMA VIRUS 18 DNA: NEGATIVE
HUMAN PAPILLOMA VIRUS FINAL DIAGNOSIS: NORMAL
HUMAN PAPILLOMA VIRUS OTHER HR: NEGATIVE

## 2022-02-04 LAB
BKR LAB AP GYN ADEQUACY: NORMAL
BKR LAB AP GYN INTERPRETATION: NORMAL
BKR LAB AP GYN OTHER FINDINGS: NORMAL
BKR LAB AP HPV REFLEX: NORMAL
BKR LAB AP PREVIOUS ABNL DX: NORMAL
BKR LAB AP PREVIOUS ABNORMAL: NORMAL
PATH REPORT.COMMENTS IMP SPEC: NORMAL
PATH REPORT.COMMENTS IMP SPEC: NORMAL
PATH REPORT.RELEVANT HX SPEC: NORMAL

## 2022-02-07 ENCOUNTER — PATIENT OUTREACH (OUTPATIENT)
Dept: FAMILY MEDICINE | Facility: CLINIC | Age: 27
End: 2022-02-07
Payer: COMMERCIAL

## 2022-02-07 NOTE — LETTER
February 7, 2022      Melissa Eastman  [address]  [place] [address]        Dear ,    We are happy to inform you that your recent Pap smear and Human Papillomavirus (HPV) test results are normal and negative.    It is recommended that you have your next Pap smear and Human Papillomavirus (HPV) test in 1 year. You will also need to return to the clinic every year for an annual wellness visit.    If you have additional questions regarding this result, please contact our office and we will be happy to assist you.      Sincerely,    Your [facility] Care Team

## 2022-04-25 DIAGNOSIS — Z30.9 ENCOUNTER FOR CONTRACEPTIVE MANAGEMENT, UNSPECIFIED: ICD-10-CM

## 2022-04-27 DIAGNOSIS — Z30.9 ENCOUNTER FOR CONTRACEPTIVE MANAGEMENT, UNSPECIFIED: ICD-10-CM

## 2022-04-27 RX ORDER — ETONOGESTREL AND ETHINYL ESTRADIOL .12; .015 MG/D; MG/D
RING VAGINAL
Qty: 3 EACH | Refills: 3 | Status: SHIPPED | OUTPATIENT
Start: 2022-04-27 | End: 2023-02-23

## 2022-04-27 NOTE — TELEPHONE ENCOUNTER
"Outpatient Medication Detail     Disp Refills Start End KENDRICK   etonogestreL-ethinyl estradioL (NUVARING) 0.12-0.015 mg/24 hr vaginal ring 4 each 3 4/23/2021  No   Sig - Route: Insert 1 each into the vagina every 28 days. Leave in for three weeks, remove for 1 week, repeat with fresh ring. - Vaginal   Sent to pharmacy as: etonogestreL 0.12 mg-ethinyl estradioL 0.015 mg/24 hr vaginal ring (NuvaRing)   Notes to Pharmacy: Please inform patient that we have been trying to contact her about abnormal pap smear and necessary follow up. She should contact the clinic.   E-Prescribing Status: Receipt confirmed by pharmacy (4/23/2021  8:17 AM CDT)     Last office visit provider:  1/28/22     Requested Prescriptions   Pending Prescriptions Disp Refills     ELURYNG 0.12-0.015 MG/24HR vaginal ring [Pharmacy Med Name: ELURYNG VAGINAL RING]  5     Sig: INSERT 1 EACH INTO THE VAGINA LEAVE IN FOR THREE WEEKS, REMOVE FOR 1 WEEK, REPEAT WITH FRESH RING.       Contraceptives Protocol Passed - 4/27/2022  1:37 PM        Passed - Patient is not a current smoker if age is 35 or older        Passed - Recent (12 mo) or future (30 days) visit within the authorizing provider's specialty     Patient has had an office visit with the authorizing provider or a provider within the authorizing providers department within the previous 12 mos or has a future within next 30 days. See \"Patient Info\" tab in inbasket, or \"Choose Columns\" in Meds & Orders section of the refill encounter.              Passed - Medication is active on med list        Passed - No active pregnancy on record        Passed - No positive pregnancy test in past 12 months             Adama Parker RN 04/27/22 1:38 PM    "

## 2022-04-27 NOTE — TELEPHONE ENCOUNTER
Reason for Call:  Medication or medication refill:    Do you use a [facility]?  Name of the pharmacy and phone number for the current request[facility] [place]    Name of the medication requested:   eluryng  vaginal ring     Other request:no  Can we leave a detailed message on this number? YES    Phone number patient can be reached at: Cell number on file:    Telephone Information:   Mobile [phone]       Best Time: anytime    Call taken on 4/27/2022 at 3:30 PM by Celine Griffith

## 2022-05-01 RX ORDER — ETONOGESTREL AND ETHINYL ESTRADIOL VAGINAL RING .015; .12 MG/D; MG/D
RING VAGINAL
Qty: 3 EACH | Refills: 3 | OUTPATIENT
Start: 2022-05-01

## 2022-07-05 ENCOUNTER — NURSE TRIAGE (OUTPATIENT)
Dept: NURSING | Facility: CLINIC | Age: 27
End: 2022-07-05

## 2022-07-05 NOTE — TELEPHONE ENCOUNTER
Nurse Triage SBAR    Is this a 2nd Level Triage? NO    Situation/Background: Patient is reporting that [place], received stitches for a laceration on her R knee on 7/4/22. Was told to remove on 7/18/22. Patient stated there were no appointments available at her clinic. She is asking if it is okay to wait to remove sutures after the recommended date.    Assessment:   Patient reports that she is open to going to a different clinic for suture removal.    Recommendation: Per disposition information provided. Patient is open to being seen at any clinic. Recommended to go to  if no appointments available. Patient verbalized understanding and agrees with plan. Transferred to scheduling.    Protocol Recommended Disposition: Telephone advice    Opal Cruz RN on 7/5/2022 at 2:50 PM    Reason for Disposition    Suture (or staple) removal date, questions about    Health Information question, no triage required and triager able to answer question    Protocols used: SUTURE OR STAPLE [id number], INFORMATION ONLY CALL - NO TRIAGE-A-AH

## 2022-07-11 DIAGNOSIS — K58.9 IRRITABLE BOWEL SYNDROME WITHOUT DIARRHEA: ICD-10-CM

## 2022-07-11 DIAGNOSIS — F33.1 MAJOR DEPRESSIVE DISORDER, RECURRENT, MODERATE (H): ICD-10-CM

## 2022-07-11 DIAGNOSIS — F41.1 GENERALIZED ANXIETY DISORDER: ICD-10-CM

## 2022-07-12 RX ORDER — ESCITALOPRAM OXALATE 10 MG/1
10 TABLET ORAL DAILY
Qty: 90 TABLET | Refills: 0 | Status: SHIPPED | OUTPATIENT
Start: 2022-07-12 | End: 2022-09-29

## 2022-07-12 RX ORDER — SUCRALFATE 1 G/1
TABLET ORAL
Qty: 180 TABLET | Refills: 2 | Status: SHIPPED | OUTPATIENT
Start: 2022-07-12 | End: 2023-09-12

## 2022-07-12 NOTE — TELEPHONE ENCOUNTER
"Outpatient Medication Detail     Disp Refills Start End KENDRICK   methotrexate 2.5 MG tablet 40 tablet 3 2021  No   Si.5 mgs twice a week   Sent to pharmacy as: methotrexate sodium 2.5 mg tablet   E-Prescribing Status: Receipt confirmed by pharmacy (2021 12:04 PM CDT)       methotrexate 2.5 MG tablet [[id number]]    Electronically signed by: Clayton Alanis MD on 21 1204 Status: Active   Ordering user: Clayton Alanis MD 21 120 Authorized by: Clayton Alanis MD   Frequency:  21 - Until Discontinued Released by: Clayton Alanis MD 21 1204   Diagnoses  Temporal arteritis (H) [M31.6]     Last office visit provider:  22     Requested Prescriptions   Pending Prescriptions Disp Refills     escitalopram (LEXAPRO) 10 MG tablet [Pharmacy Med Name: ESCITALOPRAM 10 MG TABLET] 90 tablet 3     Sig: TAKE 1 TABLET BY MOUTH EVERY DAY       SSRIs Protocol Failed - 2022 12:54 PM        Failed - PHQ-9 score less than 5 in past 6 months     Please review last PHQ-9 score.           Passed - Medication is active on med list        Passed - Patient is age 18 or older        Passed - No active pregnancy on record        Passed - No positive pregnancy test in last 12 months        Passed - Recent (6 mo) or future (30 days) visit within the authorizing provider's specialty     Patient had office visit in the last 6 months or has a visit in the next 30 days with authorizing provider or within the authorizing provider's specialty.  See \"Patient Info\" tab in inbasket, or \"Choose Columns\" in Meds & Orders section of the refill encounter.               sucralfate (CARAFATE) 1 GM tablet [Pharmacy Med Name: SUCRALFATE 1 GM TABLET] 180 tablet 3     Sig: TAKE 1 TABLET (1 G TOTAL) BY MOUTH 2 (TWO) TIMES A DAY.       Miscellaneous Gastrointestinal Agents Passed - 2022 12:54 PM        Passed - Recent (12 mo) or future (30 days) visit within the authorizing provider's specialty     " "Patient has had an office visit with the authorizing provider or a provider within the authorizing providers department within the previous 12 mos or has a future within next 30 days. See \"Patient Info\" tab in inbasket, or \"Choose Columns\" in Meds & Orders section of the refill encounter.              Passed - Medication is active on med list        Passed - Patient is 18 years of age or older             Adama Parker RN 07/12/22 9:20 AM  "

## 2022-07-12 NOTE — TELEPHONE ENCOUNTER
"Outpatient Medication Detail     Disp Refills Start End KENDRICK   escitalopram oxalate (LEXAPRO) 10 MG tablet 90 tablet 3 4/23/2021  No   Sig - Route: Take 1 tablet (10 mg total) by mouth daily. - Oral   Sent to pharmacy as: escitalopram 10 mg tablet (LEXAPRO)   Notes to Pharmacy: Requesting 1 year supply   E-Prescribing Status: Receipt confirmed by pharmacy (4/23/2021  8:17 AM CDT)       escitalopram oxalate (LEXAPRO) 10 MG tablet [[id number]]    Electronically signed by: Temi Scott MD on 04/23/21 0817 Status: Active   Ordering user: Temi Scott MD 04/23/21 0817 Authorized by: Temi Scott MD   Frequency: DAILY 04/23/21 - Until Discontinued Released by: Dedra Padilla RN 04/23/21 0817   Diagnoses  Moderate recurrent major depression (H) [F33.1]  Generalized anxiety disorder [F41.1]  Seasonal affective disorder (H) [F33.8]   Medication comments: Requesting 1 year supply     Routing refill request to provider for review/approval because:  PHQ9 score - greater than 4.     Last office visit provider:  1/28/22     Requested Prescriptions   Pending Prescriptions Disp Refills     escitalopram (LEXAPRO) 10 MG tablet [Pharmacy Med Name: ESCITALOPRAM 10 MG TABLET] 90 tablet 3     Sig: TAKE 1 TABLET BY MOUTH EVERY DAY       SSRIs Protocol Failed - 7/11/2022 12:54 PM        Failed - PHQ-9 score less than 5 in past 6 months     Please review last PHQ-9 score.           Passed - Medication is active on med list        Passed - Patient is age 18 or older        Passed - No active pregnancy on record        Passed - No positive pregnancy test in last 12 months        Passed - Recent (6 mo) or future (30 days) visit within the authorizing provider's specialty     Patient had office visit in the last 6 months or has a visit in the next 30 days with authorizing provider or within the authorizing provider's specialty.  See \"Patient Info\" tab in inbasket, or \"Choose Columns\" in Meds & " "Orders section of the refill encounter.             Signed Prescriptions Disp Refills    sucralfate (CARAFATE) 1 GM tablet 180 tablet 2     Sig: TAKE 1 TABLET (1 G TOTAL) BY MOUTH 2 (TWO) TIMES A DAY.       Miscellaneous Gastrointestinal Agents Passed - 7/11/2022 12:54 PM        Passed - Recent (12 mo) or future (30 days) visit within the authorizing provider's specialty     Patient has had an office visit with the authorizing provider or a provider within the authorizing providers department within the previous 12 mos or has a future within next 30 days. See \"Patient Info\" tab in inbasket, or \"Choose Columns\" in Meds & Orders section of the refill encounter.              Passed - Medication is active on med list        Passed - Patient is 18 years of age or older             Adama Parker RN 07/12/22 9:21 AM  "

## 2022-07-19 ENCOUNTER — OFFICE VISIT (OUTPATIENT)
Dept: INTERNAL MEDICINE | Facility: CLINIC | Age: 27
End: 2022-07-19
Payer: COMMERCIAL

## 2022-07-19 VITALS
BODY MASS INDEX: 48.16 KG/M2 | HEART RATE: 86 BPM | DIASTOLIC BLOOD PRESSURE: 74 MMHG | WEIGHT: 268.4 LBS | SYSTOLIC BLOOD PRESSURE: 118 MMHG | OXYGEN SATURATION: 98 % | RESPIRATION RATE: 18 BRPM

## 2022-07-19 DIAGNOSIS — Z48.02 ENCOUNTER FOR REMOVAL OF SUTURES: Primary | ICD-10-CM

## 2022-07-19 PROCEDURE — 99212 OFFICE O/P EST SF 10 MIN: CPT | Performed by: INTERNAL MEDICINE

## 2022-07-19 ASSESSMENT — PATIENT HEALTH QUESTIONNAIRE - PHQ9
10. IF YOU CHECKED OFF ANY PROBLEMS, HOW DIFFICULT HAVE THESE PROBLEMS MADE IT FOR YOU TO DO YOUR WORK, TAKE CARE OF THINGS AT HOME, OR GET ALONG WITH OTHER PEOPLE: SOMEWHAT DIFFICULT
SUM OF ALL RESPONSES TO PHQ QUESTIONS 1-9: 5
SUM OF ALL RESPONSES TO PHQ QUESTIONS 1-9: 5

## 2022-07-19 ASSESSMENT — PAIN SCALES - GENERAL: PAINLEVEL: NO PAIN (0)

## 2022-07-19 NOTE — PROGRESS NOTES
"  Assessment & Plan     Encounter for removal of sutures  PROCEDURE NOTE: Sutures were aligned uneventfully with a scissor and forceps 4 sutures were removed there was no bleeding wound is well-healed with no dehiscence or infection.  She can keep normal soap and water.  She is up-to-date on her tetanus vaccine                 BMI:   Estimated body mass index is 48.16 kg/m  as calculated from the following:    Height as of 1/28/22: 1.59 m (5' 2.6\").    Weight as of this encounter: 121.7 kg (268 lb 6.4 oz).           No follow-ups on file.    Anika Sweeney MD  [facility]    Nomi Torres is a 26 year old, presenting for the following health issues:  Recheck Medication and Suture Removal (Right knee/Placed in 7/4/22)      Suture Removal    History of Present Illness       Reason for visit:  Stitches  Symptoms include:  Take out stitches  Symptom intensity:  Mild  Symptom progression:  Improving    She eats 0-1 servings of fruits and vegetables daily.She consumes 1 sweetened beverage(s) daily.She exercises with enough effort to increase her heart rate 9 or less minutes per day.  She exercises with enough effort to increase her heart rate 3 or less days per week. She is missing 2 dose(s) of medications per week.  She is not taking prescribed medications regularly due to remembering to take.    Today's PHQ-9         PHQ-9 Total Score: 5    PHQ-9 Q9 Thoughts of better off dead/self-harm past 2 weeks :   Not at all    How difficult have these problems made it for you to do your work, take care of things at home, or get along with other people: Somewhat difficult       Suture  Removal       Review of Systems         Objective    /74 (BP Location: Left arm, Patient Position: Sitting, Cuff Size: Adult Large)   Pulse 86   Resp 18   Wt 121.7 kg (268 lb 6.4 oz)   SpO2 98%   BMI 48.16 kg/m    Body mass index is 48.16 kg/m .  Physical Exam   Right knee has about 6 cm linear scar " wound with a closed with 4 interrupted sutures well no cellulitis or tenderness.                    .  ..

## 2022-08-21 ENCOUNTER — HEALTH MAINTENANCE LETTER (OUTPATIENT)
Age: 27
End: 2022-08-21

## 2022-09-28 DIAGNOSIS — F41.1 GENERALIZED ANXIETY DISORDER: ICD-10-CM

## 2022-09-28 DIAGNOSIS — F33.1 MAJOR DEPRESSIVE DISORDER, RECURRENT, MODERATE (H): ICD-10-CM

## 2022-09-29 RX ORDER — ESCITALOPRAM OXALATE 10 MG/1
10 TABLET ORAL DAILY
Qty: 90 TABLET | Refills: 3 | Status: SHIPPED | OUTPATIENT
Start: 2022-09-29 | End: 2022-11-23

## 2022-09-29 NOTE — TELEPHONE ENCOUNTER
PHQ 11/25/2020 1/28/2022 7/19/2022   PHQ-9 Total Score 9 8 5   Q9: Thoughts of better off dead/self-harm past 2 weeks Not at all Not at all Not at all      No future appointments.   Health Maintenance Due   Topic Date Due     PREVENTIVE CARE VISIT  08/09/2019     COVID-19 Vaccine (4 - Booster for Moderna series) 01/29/2022     INFLUENZA VACCINE (1) 09/01/2022     BP Readings from Last 3 Encounters:   07/19/22 118/74   01/28/22 122/82   05/10/21 110/74     Melissa was seen today for medication refill.    Diagnoses and all orders for this visit:    Major depressive disorder, recurrent, moderate (H)  -     escitalopram (LEXAPRO) 10 MG tablet; Take 1 tablet (10 mg) by mouth daily    Generalized anxiety disorder  -     escitalopram (LEXAPRO) 10 MG tablet; Take 1 tablet (10 mg) by mouth daily

## 2022-09-29 NOTE — TELEPHONE ENCOUNTER
"Routing refill request to provider for review/approval because:  PHQ9 score - greater than 4.  System reports patient not taking medication.    Last Written Prescription Date:  7/12/22  Last Fill Quantity: 90,  # refills: 0   Last office visit provider:  7/19/22     Requested Prescriptions   Pending Prescriptions Disp Refills     escitalopram (LEXAPRO) 10 MG tablet [Pharmacy Med Name: ESCITALOPRAM 10 MG TABLET] 90 tablet 0     Sig: TAKE 1 TABLET (10 MG) BY MOUTH DAILY.       SSRIs Protocol Failed - 9/29/2022 10:15 AM        Failed - PHQ-9 score less than 5 in past 6 months     Please review last PHQ-9 score.           Failed - Recent (6 mo) or future (30 days) visit within the authorizing provider's specialty     Patient had office visit in the last 6 months or has a visit in the next 30 days with authorizing provider or within the authorizing provider's specialty.  See \"Patient Info\" tab in inbasket, or \"Choose Columns\" in Meds & Orders section of the refill encounter.            Passed - Medication is active on med list        Passed - Patient is age 18 or older        Passed - No active pregnancy on record        Passed - No positive pregnancy test in last 12 months             Adama Parker RN 09/29/22 10:15 AM  "

## 2022-10-10 ENCOUNTER — TELEPHONE (OUTPATIENT)
Dept: FAMILY MEDICINE | Facility: CLINIC | Age: 27
End: 2022-10-10

## 2022-10-10 ENCOUNTER — LAB REQUISITION (OUTPATIENT)
Dept: LAB | Facility: CLINIC | Age: 27
End: 2022-10-10

## 2022-10-10 DIAGNOSIS — R63.5 ABNORMAL WEIGHT GAIN: ICD-10-CM

## 2022-10-10 LAB
HBA1C MFR BLD: 5.6 %
INSULIN SERPL-ACNC: 13.2 UU/ML (ref 2.6–24.9)
TSH SERPL DL<=0.005 MIU/L-ACNC: 0.98 UIU/ML (ref 0.3–4.2)

## 2022-10-10 PROCEDURE — 83525 ASSAY OF INSULIN: CPT | Performed by: OBSTETRICS & GYNECOLOGY

## 2022-10-10 PROCEDURE — 84443 ASSAY THYROID STIM HORMONE: CPT | Performed by: OBSTETRICS & GYNECOLOGY

## 2022-10-10 PROCEDURE — 83036 HEMOGLOBIN GLYCOSYLATED A1C: CPT | Performed by: OBSTETRICS & GYNECOLOGY

## 2022-10-10 NOTE — TELEPHONE ENCOUNTER
Left message to call back for: Patient   Information to relay to patient: Received referral from her provider for the [facility] in [place].    Please help patient schedule NEW M.W.M. with Dr. Alatorre Upon return call.

## 2022-11-21 ENCOUNTER — HEALTH MAINTENANCE LETTER (OUTPATIENT)
Age: 27
End: 2022-11-21
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
## 2022-11-23 ENCOUNTER — OFFICE VISIT (OUTPATIENT)
Dept: FAMILY MEDICINE | Facility: CLINIC | Age: 27
End: 2022-11-23
Payer: COMMERCIAL

## 2022-11-23 VITALS
OXYGEN SATURATION: 97 % | HEART RATE: 91 BPM | BODY MASS INDEX: 46.76 KG/M2 | DIASTOLIC BLOOD PRESSURE: 80 MMHG | HEIGHT: 63 IN | SYSTOLIC BLOOD PRESSURE: 112 MMHG | WEIGHT: 263.9 LBS

## 2022-11-23 DIAGNOSIS — E28.2 PCOS (POLYCYSTIC OVARIAN SYNDROME): ICD-10-CM

## 2022-11-23 DIAGNOSIS — E66.01 CLASS 3 SEVERE OBESITY DUE TO EXCESS CALORIES WITH SERIOUS COMORBIDITY AND BODY MASS INDEX (BMI) OF 45.0 TO 49.9 IN ADULT (H): ICD-10-CM

## 2022-11-23 DIAGNOSIS — E88.810 METABOLIC SYNDROME X: ICD-10-CM

## 2022-11-23 DIAGNOSIS — F41.0 PANIC DISORDER WITHOUT AGORAPHOBIA: ICD-10-CM

## 2022-11-23 DIAGNOSIS — E66.813 CLASS 3 SEVERE OBESITY DUE TO EXCESS CALORIES WITH SERIOUS COMORBIDITY AND BODY MASS INDEX (BMI) OF 45.0 TO 49.9 IN ADULT (H): ICD-10-CM

## 2022-11-23 PROCEDURE — 99215 OFFICE O/P EST HI 40 MIN: CPT | Performed by: FAMILY MEDICINE

## 2022-11-23 NOTE — PATIENT INSTRUCTIONS
Macronutrient Goals    Minimum 2-3? meals per day, control daily calories   - 2307-7113? female   - 1600 male  Minimum of 4 palm servings of protein daily.  Begin everyday with protein    - ~80+ gm for female   - 120 gm for male  Be mindful of net carbs 50-75 gm/day  - (Total carbs - fiber)   - Less than 5 gm of carbs with breakfast

## 2022-11-23 NOTE — ASSESSMENT & PLAN NOTE
26 year old year old female in clinic today to discuss treatment of the following conditions through diet and lifestyle modification and weight loss:  1. Class 3 severe obesity due to excess calories with serious comorbidity and body mass index (BMI) of 45.0 to 49.9 in adult (H)    2. PCOS (polycystic ovarian syndrome)    3. Metabolic syndrome X    4. Panic disorder without agoraphobia      Intake: 26-year-old patient presents to discuss metabolic health and weight.  She gained weight over the past 10 years.  She needs criteria for metabolic syndrome.  Her primary care provider was concerned about PCOS (per [place] criteria).  We had a lengthy conversation regarding nutrition.  She is not comfortable in the kitchen which I believe will be a barrier.  She is not on any medicines that obviously cause weight gain although she has been on the past.  Unclear contribution to chronic cannabis use.  I believe she would do well with a protein rich, carbohydrate restricted diet.  I suggested slowly adding vegetables as tolerated.  I wonder about inflammation with some of her food choices as well.  We discussed medications to support nutrition change.  - Phentermine or phentermine/topiramate is relatively contraindicated.  She does have panic disorder and takes as needed hydroxyzine when she has symptoms.  She meets with her therapist weekly.  There is no adjustments in her medicines recently.  I am concerned if I prescribe phentermine she will have panic attacks and she will ultimately fail.  - Contrave (bupropion/naltrexone).  She has a history of seasonal affective disorder making Wellbutrin potentially advantageous medication.  However, for the same rationale phentermine I am concerned that the stimulating effect of bupropion will exacerbate her panic disorder.  I doubt naltrexone monotherapy would be sufficiently strong to make any difference in her nutrition.  - GLP-1 receptor agonist therapy.  I believe Oscar would  be quite helpful for this patient.  She meets criteria for metabolic syndrome.  She has physical exam characteristics of insulin resistance.  Given her fasting insulin level was higher than expected in individual of her age with the degree of fasting that she did prior to that blood draw last month.  There is no personal/family history of thyroid cancer.  No personal history of pancreatitis.  No contraindication.  We discussed potential cost.  For this medication.  Nevertheless, I am getting prescribed liraglutide (Saxenda) and will plan to transition her to semaglutide (Wegovy) at some point in the future if this is the medicine that makes the most sense for this individual.  - We will continue to focus on behavioral interventions.  She will meet with the bariatric dietitian.  She is participating in comprehensive weight management (through  [facility]).  - I would like to see this individual 6-8 weeks from now to discuss progress and medications.

## 2022-11-29 ENCOUNTER — MYC MEDICAL ADVICE (OUTPATIENT)
Dept: FAMILY MEDICINE | Facility: CLINIC | Age: 27
End: 2022-11-29

## 2022-11-29 ENCOUNTER — NURSE TRIAGE (OUTPATIENT)
Dept: NURSING | Facility: CLINIC | Age: 27
End: 2022-11-29

## 2022-11-29 NOTE — TELEPHONE ENCOUNTER
Patient notified of provider's message as written. Patient verbalized understanding.    Encouraged patient to call the clinic with further questions/concerns.     Modesto Hyde RN  [facility]

## 2022-11-29 NOTE — TELEPHONE ENCOUNTER
Please check with patient.  How many pens were distributed?  5 pens with the dosing schedule should only be 6 weeks.  If they actually gave her 15 pens which would be a 3-month supply, this was not what was ordered and is a pharmacy error.  If she only has 5 pens, she would then have to go off of the medicine and then start over but she would be able to go up on the medicine quite quickly given her previous exposure.    There is not an alternative that would be covered by her insurance.  She probably is very early in using this medication and so is unclear if she even tolerates it.  Unfortunately, the medicine is so expensive that it is unlikely that she will be able to afford it paying out-of-pocket.  We may need to wait a couple months until her insurance will pay.

## 2022-11-29 NOTE — TELEPHONE ENCOUNTER
Triage call:     Patient calling to report that she did not refrigerate her Sexenda last night.  Pharmacy advised her not to re-refrigerate and that it will  in 30 days.   Her 3 month supply will  in 30 days.   Insurance will not cover a replacement.   She is looking for advice on how to proceed with taking this medication. SHould she take it for 30 days and wait until refill is approved?  Is there a different medication she should be taking?     Please advise.     Peggy Byrd RN   22 11:27 AM  [facility] Nurse Advisor

## 2022-12-06 DIAGNOSIS — F41.1 GENERALIZED ANXIETY DISORDER: Primary | ICD-10-CM

## 2022-12-06 NOTE — TELEPHONE ENCOUNTER
"Routing refill request to provider for review/approval because:  Medication is reported/historical    Last Written Prescription Date:  N/A  Last Fill Quantity: N/A,  # refills: N/A   Last office visit provider:  11/23/22     Requested Prescriptions   Pending Prescriptions Disp Refills     escitalopram (LEXAPRO) 20 MG tablet 90 tablet 3     Sig: Take 1 tablet (20 mg) by mouth daily       SSRIs Protocol Passed - 12/6/2022  3:39 PM        Passed - Recent (12 mo) or future (30 days) visit within the authorizing provider's specialty     Patient has had an office visit with the authorizing provider or a provider within the authorizing providers department within the previous 12 mos or has a future within next 30 days. See \"Patient Info\" tab in inbasket, or \"Choose Columns\" in Meds & Orders section of the refill encounter.              Passed - Medication is active on med list        Passed - Patient is age 18 or older        Passed - No active pregnancy on record        Passed - No positive pregnancy test in last 12 months             Ninoska Nevarez RN 12/06/22 3:39 PM  "

## 2022-12-07 RX ORDER — ESCITALOPRAM OXALATE 20 MG/1
20 TABLET ORAL DAILY
Qty: 90 TABLET | Refills: 3 | Status: SHIPPED | OUTPATIENT
Start: 2022-12-07 | End: 2023-12-15

## 2023-01-06 ENCOUNTER — PATIENT OUTREACH (OUTPATIENT)
Dept: FAMILY MEDICINE | Facility: CLINIC | Age: 28
End: 2023-01-06

## 2023-01-06 DIAGNOSIS — R87.613 HSIL (HIGH GRADE SQUAMOUS INTRAEPITHELIAL LESION) ON PAP SMEAR OF CERVIX: ICD-10-CM

## 2023-01-06 NOTE — TELEPHONE ENCOUNTER
10/10/22 NIL Pap, Neg HR HPV Plan cotest in 1 year per guidelines ([facility])  01/6/23 called [email or web address] and left a vm to have Yasmin in medical records fax over the visit note.

## 2023-01-26 ENCOUNTER — OFFICE VISIT (OUTPATIENT)
Dept: OPHTHALMOLOGY | Facility: CLINIC | Age: 28
End: 2023-01-26
Attending: OPHTHALMOLOGY
Payer: COMMERCIAL

## 2023-01-26 DIAGNOSIS — H53.40 VISUAL FIELD DEFECT: ICD-10-CM

## 2023-01-26 DIAGNOSIS — H53.40 VISUAL FIELD DEFECT: Primary | ICD-10-CM

## 2023-01-26 DIAGNOSIS — H44.521 PHTHISIS BULBI OF RIGHT EYE: ICD-10-CM

## 2023-01-26 DIAGNOSIS — Q14.2 CONGENITAL MALFORMATION OF OPTIC DISC: Primary | ICD-10-CM

## 2023-01-26 PROCEDURE — 92014 COMPRE OPH EXAM EST PT 1/>: CPT | Mod: GC | Performed by: OPHTHALMOLOGY

## 2023-01-26 PROCEDURE — 92083 EXTENDED VISUAL FIELD XM: CPT | Performed by: OPHTHALMOLOGY

## 2023-01-26 PROCEDURE — G0463 HOSPITAL OUTPT CLINIC VISIT: HCPCS | Mod: 25

## 2023-01-26 PROCEDURE — 92133 CPTRZD OPH DX IMG PST SGM ON: CPT | Performed by: OPHTHALMOLOGY

## 2023-01-26 RX ORDER — ATROPINE SULFATE 10 MG/ML
1 SOLUTION/ DROPS OPHTHALMIC DAILY
Qty: 5 ML | Refills: 3 | Status: SHIPPED | OUTPATIENT
Start: 2023-01-26 | End: 2024-04-09

## 2023-01-26 ASSESSMENT — CUP TO DISC RATIO
OD_RATIO: NO VIEW
OS_RATIO: 0.6

## 2023-01-26 ASSESSMENT — TONOMETRY
IOP_METHOD: ICARE
OD_IOP_MMHG: 2
OS_IOP_MMHG: 15

## 2023-01-26 ASSESSMENT — CONF VISUAL FIELD
METHOD: COUNTING FINGERS
OS_SUPERIOR_NASAL_RESTRICTION: 0
OS_INFERIOR_TEMPORAL_RESTRICTION: 0
OS_NORMAL: 1
OS_INFERIOR_NASAL_RESTRICTION: 0
OS_SUPERIOR_TEMPORAL_RESTRICTION: 0

## 2023-01-26 ASSESSMENT — REFRACTION_WEARINGRX
OD_SPHERE: PLANO
OS_AXIS: 076
OS_SPHERE: +0.25
OS_CYLINDER: +1.25

## 2023-01-26 ASSESSMENT — REFRACTION_MANIFEST
OD_SPHERE: BALANCE
OS_CYLINDER: +1.25
OS_AXIS: 080
OS_SPHERE: +0.50

## 2023-01-26 ASSESSMENT — SLIT LAMP EXAM - LIDS
COMMENTS: NORMAL
COMMENTS: PTOSIS

## 2023-01-26 ASSESSMENT — VISUAL ACUITY
OS_CC: 20/15
OD_CC: NLP
METHOD: SNELLEN - LINEAR

## 2023-01-26 ASSESSMENT — EXTERNAL EXAM - LEFT EYE: OS_EXAM: NORMAL

## 2023-01-26 NOTE — PROGRESS NOTES
Assessment & Plan     Melissa Eastman is a 27 year old female with the following diagnoses:   1. Congenital malformation of optic disc    2. Visual field defect    3. Phthisis bulbi of right eye         Follow up morning glory disc anomaly RIGHT eye and enlargement of the right optic nerve and chiasm, and Retinal detachment  RIGHT eye.   Last visit was September 2020.  At her last visit, she appeared to have an Retinal detachment  By B-scan ultrasound.  Today, she     Her visual acuity is no light perception RIGHT eye and 20/15 LEFT eye.      She has a history of MGDA RIGHT eye.  It has been reported that patients with MGDA can have ipsilateral enlargement of the posterior optic nerve and chiasm.  This is most likely a congenital issue and does not represent a true tumor of the anterior visual pathway.  She is having pain in her RIGHT eye.  Will try atropine 1% once a day. We discussed that enucleation or evisceration is a consideration when one has a blind painful eye.              Attending Physician Attestation:  Complete documentation of historical and exam elements from today's encounter can be found in the full encounter summary report (not reduplicated in this progress note).  I personally obtained the chief complaint(s) and history of present illness.  I confirmed and edited as necessary the review of systems, past medical/surgical history, family history, social history, and examination findings as documented by others; and I examined the patient myself.  I personally reviewed the relevant tests, images, and reports as documented above.  I formulated and edited as necessary the assessment and plan and discussed the findings and management plan with the patient and family. I personally reviewed the ophthalmic test(s) associated with this encounter, agree with the interpretation(s) as documented by the resident/fellow, and have edited the corresponding report(s) as necessary.  - Isaías Willis  MD Nish Nguyen MD   [id number]

## 2023-01-26 NOTE — NURSING NOTE
Chief Complaints and History of Present Illnesses   Patient presents with     Follow Up     Chief Complaint(s) and History of Present Illness(es)     Follow Up            Laterality: right eye    Quality: blurred vision    Severity: mild    Course: stable    Associated symptoms: glare.  Negative for double vision and eye pain    Response to treatment: no improvement          Comments    Patient feels vision stable.  Having trouble driving at night.  Right eye more sensitive if she rubs.  Artificial tears infrequently do not provide relief.      Radha Lin on 1/26/2023 at 9:54 AM

## 2023-01-31 ENCOUNTER — VIRTUAL VISIT (OUTPATIENT)
Dept: FAMILY MEDICINE | Facility: CLINIC | Age: 28
End: 2023-01-31
Payer: COMMERCIAL

## 2023-01-31 DIAGNOSIS — E28.2 PCOS (POLYCYSTIC OVARIAN SYNDROME): ICD-10-CM

## 2023-01-31 DIAGNOSIS — F12.20 CANNABIS DEPENDENCE, UNCOMPLICATED (H): ICD-10-CM

## 2023-01-31 DIAGNOSIS — F33.1 MODERATE RECURRENT MAJOR DEPRESSION (H): ICD-10-CM

## 2023-01-31 DIAGNOSIS — D33.3 BENIGN NEOPLASM OF CRANIAL NERVE (H): ICD-10-CM

## 2023-01-31 DIAGNOSIS — E66.01 CLASS 3 SEVERE OBESITY DUE TO EXCESS CALORIES WITH SERIOUS COMORBIDITY AND BODY MASS INDEX (BMI) OF 45.0 TO 49.9 IN ADULT (H): Primary | ICD-10-CM

## 2023-01-31 DIAGNOSIS — E66.813 CLASS 3 SEVERE OBESITY DUE TO EXCESS CALORIES WITH SERIOUS COMORBIDITY AND BODY MASS INDEX (BMI) OF 45.0 TO 49.9 IN ADULT (H): Primary | ICD-10-CM

## 2023-01-31 DIAGNOSIS — E88.810 METABOLIC SYNDROME X: ICD-10-CM

## 2023-01-31 PROCEDURE — 99213 OFFICE O/P EST LOW 20 MIN: CPT | Mod: GT | Performed by: FAMILY MEDICINE

## 2023-01-31 RX ORDER — SEMAGLUTIDE 0.25 MG/.5ML
0.25 INJECTION, SOLUTION SUBCUTANEOUS WEEKLY
Qty: 2 ML | Refills: 0 | Status: SHIPPED | OUTPATIENT
Start: 2023-01-31 | End: 2023-02-22

## 2023-01-31 ASSESSMENT — ENCOUNTER SYMPTOMS: CONSTITUTIONAL NEGATIVE: 1

## 2023-01-31 NOTE — ASSESSMENT & PLAN NOTE
27 year old year old female in clinic today to discuss treatment of the following conditions through diet and lifestyle modification and weight loss:  1. Class 3 severe obesity due to excess calories with serious comorbidity and body mass index (BMI) of 45.0 to 49.9 in adult (H)    2. Metabolic syndrome X    3. Moderate recurrent major depression (H)    4. Cannabis dependence, uncomplicated (H)    5. Benign neoplasm of cranial nerve (H)    6. PCOS (polycystic ovarian syndrome)      The patient's weight loss result since the last visit was successful based on lifestyle improvements. She has initially done well on saxenda.  Family supportive.  She has been shopping. Lots of protein.  Will continue calorically restricted diet.  Will plan to continue to review nutrition.   - She is tolerant to Saxenda.  Originally, Saxenda was prescribed as Wegovy was not stocked in the pharmacies.  This is now resolved.  We will resume therapy but will start with Wegovy and titrate as tolerated.

## 2023-01-31 NOTE — PROGRESS NOTES
Melissa is a 27 year old who is being evaluated via a billable video visit.      How would you like to obtain your AVS? MyChart  If the video visit is dropped, the invitation should be resent by: [phone]  Will anyone else be joining your video visit? No    Video-Visit Details    Type of service:  Video Visit   Originating Location (pt. Location): Home  Distant Location (provider location):  On-site  Platform used for Video Visit: Effektif    Problem List Items Addressed This Visit     Benign neoplasm of cranial nerve (H)     Non-contributory to weight gain. She recently saw eye doctor.         Relevant Medications    Semaglutide-Weight Management (WEGOVY) 0.25 MG/0.5ML SOAJ    Cannabis dependence, uncomplicated (H)     Reviewed.  No changes.          Class 3 severe obesity due to excess calories with serious comorbidity and body mass index (BMI) of 45.0 to 49.9 in adult (H) - Primary     27 year old year old female in clinic today to discuss treatment of the following conditions through diet and lifestyle modification and weight loss:  1. Class 3 severe obesity due to excess calories with serious comorbidity and body mass index (BMI) of 45.0 to 49.9 in adult (H)    2. Metabolic syndrome X    3. Moderate recurrent major depression (H)    4. Cannabis dependence, uncomplicated (H)    5. Benign neoplasm of cranial nerve (H)    6. PCOS (polycystic ovarian syndrome)      The patient's weight loss result since the last visit was successful based on lifestyle improvements. She has initially done well on saxenda.  Family supportive.  She has been shopping. Lots of protein.  Will continue calorically restricted diet.  Will plan to continue to review nutrition.   - She is tolerant to Saxenda.  Originally, Saxenda was prescribed as Wegovy was not stocked in the pharmacies.  This is now resolved.  We will resume therapy but will start with Wegovy and titrate as tolerated.         Relevant Medications    Semaglutide-Weight  "Management (WEGOVY) 0.25 MG/0.5ML SOAJ    Moderate recurrent major depression (H)     Doing well.  Mood stable despite life style changes.          PCOS (polycystic ovarian syndrome)   Other Visit Diagnoses     Metabolic syndrome X        Relevant Medications    Semaglutide-Weight Management (WEGOVY) 0.25 MG/0.5ML SOAJ         Follow-up Visit   Expected date:  Feb 10, 2023 (Approximate)      Follow Up Appointment Details:     Follow-up with whom?: Other Primary Care Services    Follow-Up for what?: Nurse Visit    How?: In Person    Is this an as-needed follow-up?: No              Follow-up Visit   Expected date:  Apr 30, 2023 (Approximate)      Follow Up Appointment Details:     Follow-up with whom?: Me    Follow-Up for what?: Chronic Disease f/u    Chronic Disease f/u: General (Other)    Additional Details: Weight loss Follow up    How?: In Person    Is this an as-needed follow-up?: No                     Subjective   Melissa is a 27 year old who presents for the following health issues   Chief Complaint   Patient presents with     Weight Loss     Weight loss follow up. Pt had no scale to check weight today, last weight check was in clinic.       Patient presents for treatment of chronic, comorbid conditions using intensive therapeutic lifestyle interventions including nutrition, physical activity, and behavior management.   - successes: she has been cooking more.  Sister has been diagnosed with prediabetes. \"I feel better.\"   - struggles: family struggles.  Less eating out.  More cooking together.     - exercise plan: active.  Walking more.    - tracking/journaling: ad farrah.  Eating more protein.     - following nutritional plan: carb restricted.  Deviations from plan: infrequent.   - hunger: controlled.   - medication benefits: helpful. side effects: none.       History of Present Illness       Reason for visit:  Pcos    She eats 0-1 servings of fruits and vegetables daily.She consumes 2 sweetened beverage(s) " daily.She exercises with enough effort to increase her heart rate 9 or less minutes per day.  She exercises with enough effort to increase her heart rate 3 or less days per week. She is missing 2 dose(s) of medications per week.  She is not taking prescribed medications regularly due to remembering to take.     Review of Systems   Constitutional: Negative.    All other systems reviewed and are negative.        Objective       Vitals:  No vitals were obtained today due to virtual visit.    Physical Exam  Nursing note reviewed.   Constitutional:       General: She is not in acute distress.     Appearance: Normal appearance. She is not ill-appearing.   HENT:      Head: Normocephalic and atraumatic.   Eyes:      Extraocular Movements: Extraocular movements intact.      Conjunctiva/sclera: Conjunctivae normal.   Pulmonary:      Effort: Pulmonary effort is normal.   Neurological:      Mental Status: She is alert and oriented to person, place, and time.   Psychiatric:         Attention and Perception: Attention normal.         Mood and Affect: Mood normal.         Speech: Speech normal.         Thought Content: Thought content normal.                  This note has been dictated using voice recognition software. Any grammatical or context distortions are unintentional and inherent to the software

## 2023-02-22 ENCOUNTER — ALLIED HEALTH/NURSE VISIT (OUTPATIENT)
Dept: FAMILY MEDICINE | Facility: CLINIC | Age: 28
End: 2023-02-22
Attending: FAMILY MEDICINE
Payer: COMMERCIAL

## 2023-02-22 ENCOUNTER — MYC MEDICAL ADVICE (OUTPATIENT)
Dept: FAMILY MEDICINE | Facility: CLINIC | Age: 28
End: 2023-02-22

## 2023-02-22 VITALS — BODY MASS INDEX: 45.71 KG/M2 | WEIGHT: 256 LBS

## 2023-02-22 DIAGNOSIS — E88.810 METABOLIC SYNDROME X: ICD-10-CM

## 2023-02-22 DIAGNOSIS — E66.813 CLASS 3 SEVERE OBESITY DUE TO EXCESS CALORIES WITH SERIOUS COMORBIDITY AND BODY MASS INDEX (BMI) OF 45.0 TO 49.9 IN ADULT (H): ICD-10-CM

## 2023-02-22 DIAGNOSIS — R63.4 WEIGHT LOSS: Primary | ICD-10-CM

## 2023-02-22 DIAGNOSIS — E66.01 CLASS 3 SEVERE OBESITY DUE TO EXCESS CALORIES WITH SERIOUS COMORBIDITY AND BODY MASS INDEX (BMI) OF 45.0 TO 49.9 IN ADULT (H): ICD-10-CM

## 2023-02-22 PROCEDURE — 99207 PR NO CHARGE NURSE ONLY: CPT

## 2023-02-22 RX ORDER — SEMAGLUTIDE 0.5 MG/.5ML
0.5 INJECTION, SOLUTION SUBCUTANEOUS WEEKLY
Qty: 2 ML | Refills: 0 | Status: SHIPPED | OUTPATIENT
Start: 2023-02-22 | End: 2023-04-27 | Stop reason: DRUGHIGH

## 2023-02-22 NOTE — PROGRESS NOTES
Patient presented to clinic for weight check.     2/2/23 256lb    Patient had no other concerns. Will route to MD to review.     Michelle Figueroa RN

## 2023-02-23 DIAGNOSIS — Z30.9 ENCOUNTER FOR CONTRACEPTIVE MANAGEMENT, UNSPECIFIED: ICD-10-CM

## 2023-02-23 RX ORDER — ETONOGESTREL/ETHINYL ESTRADIOL .12-.015MG
RING, VAGINAL VAGINAL
Qty: 3 EACH | Refills: 3 | Status: SHIPPED | OUTPATIENT
Start: 2023-02-23 | End: 2023-12-15

## 2023-02-23 NOTE — TELEPHONE ENCOUNTER
"Last Written Prescription Date:  4/27/22  Last Fill Quantity: 3,  # refills: 3   Last office visit provider:  1/31/23     Requested Prescriptions   Pending Prescriptions Disp Refills     NUVARING 0.12-0.015 MG/24HR vaginal ring [Pharmacy Med Name: NUVARING VAGINAL RING]  3     Sig: INSERT 1 EACH INTO THE VAGINA LEAVE IN FOR THREE WEEKS, REMOVE FOR 1 WEEK, REPEAT WITH FRESH RING.       Contraceptives Protocol Failed - 2/23/2023  2:36 PM        Failed - Recent (12 mo) or future (30 days) visit within the authorizing provider's specialty     Patient has had an office visit with the authorizing provider or a provider within the authorizing providers department within the previous 12 mos or has a future within next 30 days. See \"Patient Info\" tab in inbasket, or \"Choose Columns\" in Meds & Orders section of the refill encounter.              Passed - Patient is not a current smoker if age is 35 or older        Passed - Medication is active on med list        Passed - No active pregnancy on record        Passed - No positive pregnancy test in past 12 months             Anne Marie Ma RN 02/23/23 2:57 PM  "

## 2023-02-23 NOTE — TELEPHONE ENCOUNTER
Patient calls back inquiring about status of request. Patient is all out of medication and will need refill before Sunday 02/26/2023.  Writer inform caller message was sent to provider- awaiting for response. Writer will send provider another message. Caller verbalizes understanding.     Angela Mann,   [facility]  February 23, 2023 2:35 PM

## 2023-03-30 ENCOUNTER — TELEPHONE (OUTPATIENT)
Dept: FAMILY MEDICINE | Facility: CLINIC | Age: 28
End: 2023-03-30
Payer: COMMERCIAL

## 2023-03-30 DIAGNOSIS — E66.813 CLASS 3 SEVERE OBESITY DUE TO EXCESS CALORIES WITH SERIOUS COMORBIDITY AND BODY MASS INDEX (BMI) OF 45.0 TO 49.9 IN ADULT (H): Primary | ICD-10-CM

## 2023-03-30 DIAGNOSIS — E66.01 CLASS 3 SEVERE OBESITY DUE TO EXCESS CALORIES WITH SERIOUS COMORBIDITY AND BODY MASS INDEX (BMI) OF 45.0 TO 49.9 IN ADULT (H): Primary | ICD-10-CM

## 2023-03-30 RX ORDER — SEMAGLUTIDE 1 MG/.5ML
1 INJECTION, SOLUTION SUBCUTANEOUS WEEKLY
Qty: 2 ML | Refills: 0 | Status: SHIPPED | OUTPATIENT
Start: 2023-03-30 | End: 2023-05-25

## 2023-03-30 NOTE — TELEPHONE ENCOUNTER
General Call      Reason for Call:  Wegovy    What are your questions or concerns:  Patient is ready to move to next dose, is requesting 1MG Wegovy pens for her refill to the:   [facility] #[id number]    Date of last appointment with provider: 01/31/23    Could we send this information to you in [facility] or would you prefer to receive a phone call?:   Patient would prefer a phone call     Okay to leave a detailed message?: No at Cell number on file:    Telephone Information:   Mobile [phone]

## 2023-04-27 DIAGNOSIS — E66.813 CLASS 3 SEVERE OBESITY DUE TO EXCESS CALORIES WITH SERIOUS COMORBIDITY AND BODY MASS INDEX (BMI) OF 45.0 TO 49.9 IN ADULT (H): Primary | ICD-10-CM

## 2023-04-27 DIAGNOSIS — E66.01 CLASS 3 SEVERE OBESITY DUE TO EXCESS CALORIES WITH SERIOUS COMORBIDITY AND BODY MASS INDEX (BMI) OF 45.0 TO 49.9 IN ADULT (H): Primary | ICD-10-CM

## 2023-04-27 NOTE — TELEPHONE ENCOUNTER
wegovy 1.7mg prepped below, patient reports tolerating 1mg dose with minimal side effects. Had nausea but states it was manageable.

## 2023-04-27 NOTE — TELEPHONE ENCOUNTER
General Call      Reason for Call:  Patient almost finished taking: Semaglutide-Weight Management (WEGOVY) 1 MG/0.5ML pen    What are your questions or concerns:    Patient requesting to move up to the next dose.  [facility] #[id number]    Date of last appointment with provider: 01/31/23

## 2023-05-11 NOTE — TELEPHONE ENCOUNTER
Patient's (Body mass index is 35.31 kg/m².) indicates that they are morbidly/severely obese (BMI > 40 or > 35 with obesity - related health condition) with health conditions that include dyslipidemias . Weight is worsening. BMI  is above average; BMI management plan is completed. We discussed portion control and increasing exercise.    Set up for 1 week supply at the requested pharmacy and I will sign

## 2023-05-25 ENCOUNTER — OFFICE VISIT (OUTPATIENT)
Dept: FAMILY MEDICINE | Facility: CLINIC | Age: 28
End: 2023-05-25
Payer: COMMERCIAL

## 2023-05-25 VITALS
WEIGHT: 236.6 LBS | HEART RATE: 91 BPM | BODY MASS INDEX: 41.92 KG/M2 | RESPIRATION RATE: 16 BRPM | DIASTOLIC BLOOD PRESSURE: 70 MMHG | SYSTOLIC BLOOD PRESSURE: 109 MMHG | OXYGEN SATURATION: 98 % | HEIGHT: 63 IN

## 2023-05-25 DIAGNOSIS — E28.2 PCOS (POLYCYSTIC OVARIAN SYNDROME): ICD-10-CM

## 2023-05-25 DIAGNOSIS — E66.813 CLASS 3 SEVERE OBESITY DUE TO EXCESS CALORIES WITH SERIOUS COMORBIDITY AND BODY MASS INDEX (BMI) OF 40.0 TO 44.9 IN ADULT (H): Primary | ICD-10-CM

## 2023-05-25 DIAGNOSIS — R11.0 NAUSEA: ICD-10-CM

## 2023-05-25 DIAGNOSIS — E66.01 CLASS 3 SEVERE OBESITY DUE TO EXCESS CALORIES WITH SERIOUS COMORBIDITY AND BODY MASS INDEX (BMI) OF 40.0 TO 44.9 IN ADULT (H): Primary | ICD-10-CM

## 2023-05-25 PROCEDURE — 99213 OFFICE O/P EST LOW 20 MIN: CPT | Performed by: FAMILY MEDICINE

## 2023-05-25 RX ORDER — ONDANSETRON 4 MG/1
4 TABLET, ORALLY DISINTEGRATING ORAL EVERY 8 HOURS PRN
Qty: 30 TABLET | Refills: 1 | Status: SHIPPED | OUTPATIENT
Start: 2023-05-25 | End: 2023-12-15

## 2023-05-25 RX ORDER — SEMAGLUTIDE 2.4 MG/.75ML
2.4 INJECTION, SOLUTION SUBCUTANEOUS WEEKLY
Qty: 3 ML | Refills: 5 | Status: SHIPPED | OUTPATIENT
Start: 2023-05-25 | End: 2023-06-15

## 2023-05-25 RX ORDER — DICYCLOMINE HCL 20 MG
20 TABLET ORAL 4 TIMES DAILY PRN
Qty: 20 TABLET | Refills: 1 | Status: SHIPPED | OUTPATIENT
Start: 2023-05-25 | End: 2023-12-15

## 2023-05-25 ASSESSMENT — PATIENT HEALTH QUESTIONNAIRE - PHQ9
SUM OF ALL RESPONSES TO PHQ QUESTIONS 1-9: 5
10. IF YOU CHECKED OFF ANY PROBLEMS, HOW DIFFICULT HAVE THESE PROBLEMS MADE IT FOR YOU TO DO YOUR WORK, TAKE CARE OF THINGS AT HOME, OR GET ALONG WITH OTHER PEOPLE: SOMEWHAT DIFFICULT
SUM OF ALL RESPONSES TO PHQ QUESTIONS 1-9: 5

## 2023-05-25 ASSESSMENT — ENCOUNTER SYMPTOMS: CONSTITUTIONAL NEGATIVE: 1

## 2023-05-25 NOTE — ASSESSMENT & PLAN NOTE
27 year old year old female in clinic today to discuss treatment of the following conditions through diet and lifestyle modification and weight loss:  1. Class 3 severe obesity due to excess calories with serious comorbidity and body mass index (BMI) of 40.0 to 44.9 in adult (H)    2. Nausea    3. PCOS (polycystic ovarian syndrome)      The patient's weight loss result since the last visit was successful based on weight loss.  She is working to add more variety to her nutrition.  Overall doing well.  Aware of need to eat protein and sufficient food.  Discussed movement.   - continue wegovy titration.   - overall, doing better.    - recheck in 3 months.     Starting weight 263 ~ 6 months ago.  Down 10.3%.
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
## 2023-06-14 ENCOUNTER — MYC MEDICAL ADVICE (OUTPATIENT)
Dept: FAMILY MEDICINE | Facility: CLINIC | Age: 28
End: 2023-06-14
Payer: COMMERCIAL

## 2023-06-14 DIAGNOSIS — E66.01 CLASS 3 SEVERE OBESITY DUE TO EXCESS CALORIES WITH SERIOUS COMORBIDITY AND BODY MASS INDEX (BMI) OF 45.0 TO 49.9 IN ADULT (H): ICD-10-CM

## 2023-06-14 DIAGNOSIS — E66.813 CLASS 3 SEVERE OBESITY DUE TO EXCESS CALORIES WITH SERIOUS COMORBIDITY AND BODY MASS INDEX (BMI) OF 45.0 TO 49.9 IN ADULT (H): ICD-10-CM

## 2023-06-15 RX ORDER — SEMAGLUTIDE 1.7 MG/.75ML
1.7 INJECTION, SOLUTION SUBCUTANEOUS WEEKLY
Qty: 3 ML | Refills: 1 | Status: SHIPPED | OUTPATIENT
Start: 2023-06-15 | End: 2023-07-07

## 2023-07-02 ENCOUNTER — MYC MEDICAL ADVICE (OUTPATIENT)
Dept: FAMILY MEDICINE | Facility: CLINIC | Age: 28
End: 2023-07-02
Payer: COMMERCIAL

## 2023-07-03 NOTE — TELEPHONE ENCOUNTER
Spoke with Melissa about Wegovy side effects.  Today she is feeling better and has kept food down.  She plans to Pre-Medicate with zofran and will take jim tea tonight, with her next dose.      Appointment made for Friday.

## 2023-07-07 ENCOUNTER — OFFICE VISIT (OUTPATIENT)
Dept: FAMILY MEDICINE | Facility: CLINIC | Age: 28
End: 2023-07-07
Payer: COMMERCIAL

## 2023-07-07 VITALS
TEMPERATURE: 99 F | OXYGEN SATURATION: 99 % | HEIGHT: 63 IN | DIASTOLIC BLOOD PRESSURE: 77 MMHG | HEART RATE: 75 BPM | WEIGHT: 227.44 LBS | SYSTOLIC BLOOD PRESSURE: 108 MMHG | BODY MASS INDEX: 40.3 KG/M2 | RESPIRATION RATE: 16 BRPM

## 2023-07-07 DIAGNOSIS — E66.01 CLASS 3 SEVERE OBESITY DUE TO EXCESS CALORIES WITH SERIOUS COMORBIDITY AND BODY MASS INDEX (BMI) OF 40.0 TO 44.9 IN ADULT (H): Primary | ICD-10-CM

## 2023-07-07 DIAGNOSIS — E66.813 CLASS 3 SEVERE OBESITY DUE TO EXCESS CALORIES WITH SERIOUS COMORBIDITY AND BODY MASS INDEX (BMI) OF 40.0 TO 44.9 IN ADULT (H): Primary | ICD-10-CM

## 2023-07-07 DIAGNOSIS — E88.810 METABOLIC SYNDROME X: ICD-10-CM

## 2023-07-07 DIAGNOSIS — E28.2 PCOS (POLYCYSTIC OVARIAN SYNDROME): ICD-10-CM

## 2023-07-07 PROCEDURE — 99214 OFFICE O/P EST MOD 30 MIN: CPT | Performed by: FAMILY MEDICINE

## 2023-07-07 RX ORDER — SEMAGLUTIDE 1.7 MG/.75ML
1.7 INJECTION, SOLUTION SUBCUTANEOUS WEEKLY
Qty: 3 ML | Refills: 5 | Status: SHIPPED | OUTPATIENT
Start: 2023-07-07 | End: 2024-03-06

## 2023-07-07 ASSESSMENT — ENCOUNTER SYMPTOMS: CONSTITUTIONAL NEGATIVE: 1

## 2023-07-07 NOTE — PROGRESS NOTES
Assessment & Plan   Problem List Items Addressed This Visit     Class 3 severe obesity due to excess calories with serious comorbidity and body mass index (BMI) of 40.0 to 44.9 in adult (H) - Primary     27 year old year old female in clinic today to discuss treatment of the following conditions through diet and lifestyle modification and weight loss:  1. Class 3 severe obesity due to excess calories with serious comorbidity and body mass index (BMI) of 40.0 to 44.9 in adult (H)    2. Class 3 severe obesity due to excess calories with serious comorbidity and body mass index (BMI) of 45.0 to 49.9 in adult (H)    3. PCOS (polycystic ovarian syndrome)      SEVERE side effects started when she went up to the 2.4mg/wk dose of medication.  Nausea and drive heaving.  Discussed the possibility that her insurance will not cover 1.7mg/wk ongoing will check in the pharmacy.  If not covered we will need to shift to an alternative plan.  PRN zofran.  Reviewed nutrition goals.  Recheck in 3 months recommended.          Relevant Medications    Semaglutide-Weight Management (WEGOVY) 1.7 MG/0.75ML pen    PCOS (polycystic ovarian syndrome)    Relevant Medications    Semaglutide-Weight Management (WEGOVY) 1.7 MG/0.75ML pen   Other Visit Diagnoses     Metabolic syndrome X        Relevant Medications    Semaglutide-Weight Management (WEGOVY) 1.7 MG/0.75ML pen          Dom Resendiz MD  [facility] SAVANNAH Torres is a 27 year old, presenting for the following health issues:  Weight Loss (Follow-up/)        7/7/2023     3:50 PM   Additional Questions   Roomed by sac   Accompanied by self         7/7/2023     3:50 PM   Patient Reported Additional Medications   Patient reports taking the following new medications no     Patient presents for treatment of chronic, comorbid conditions using intensive therapeutic lifestyle interventions including nutrition, physical activity, and behavior management.   -  "successes: doing better recently since dose adjustment downward   - struggles: severe side effects.  Struggling to get food into her system.  Struggling to maintain hydration.   - exercise plan: active as able.    - medication benefits: appetite suppression. side effects: SEVERE side effects after going up to 2.4mg/wk      History of Present Illness       Reason for visit:  Weight managment    She eats 0-1 servings of fruits and vegetables daily.She consumes 1 sweetened beverage(s) daily.She exercises with enough effort to increase her heart rate 9 or less minutes per day.  She exercises with enough effort to increase her heart rate 3 or less days per week. She is missing 3 dose(s) of medications per week.    Review of Systems   Constitutional: Negative.    All other systems reviewed and are negative.         Objective    /77 (BP Location: Left arm, Patient Position: Sitting, Cuff Size: Adult Large)   Pulse 75   Temp 99  F (37.2  C) (Oral)   Resp 16   Ht 1.593 m (5' 2.7\")   Wt 103.2 kg (227 lb 7 oz)   LMP  (LMP Unknown)   SpO2 99%   BMI 40.68 kg/m    Body mass index is 40.68 kg/m .  Physical Exam  Nursing note reviewed.   Constitutional:       General: She is not in acute distress.     Appearance: Normal appearance. She is not ill-appearing.   HENT:      Head: Normocephalic and atraumatic.   Eyes:      Extraocular Movements: Extraocular movements intact.      Conjunctiva/sclera: Conjunctivae normal.   Pulmonary:      Effort: Pulmonary effort is normal.   Neurological:      Mental Status: She is alert and oriented to person, place, and time.   Psychiatric:         Attention and Perception: Attention normal.         Mood and Affect: Mood normal.         Speech: Speech normal.         Thought Content: Thought content normal.                            "

## 2023-07-07 NOTE — ASSESSMENT & PLAN NOTE
27 year old year old female in clinic today to discuss treatment of the following conditions through diet and lifestyle modification and weight loss:  1. Class 3 severe obesity due to excess calories with serious comorbidity and body mass index (BMI) of 40.0 to 44.9 in adult (H)    2. Class 3 severe obesity due to excess calories with serious comorbidity and body mass index (BMI) of 45.0 to 49.9 in adult (H)    3. PCOS (polycystic ovarian syndrome)      SEVERE side effects started when she went up to the 2.4mg/wk dose of medication.  Nausea and drive heaving.  Discussed the possibility that her insurance will not cover 1.7mg/wk ongoing will check in the pharmacy.  If not covered we will need to shift to an alternative plan.  PRN zofran.  Reviewed nutrition goals.  Recheck in 3 months recommended.

## 2023-09-10 ENCOUNTER — TELEPHONE (OUTPATIENT)
Dept: FAMILY MEDICINE | Facility: CLINIC | Age: 28
End: 2023-09-10
Payer: COMMERCIAL

## 2023-09-10 DIAGNOSIS — K58.9 IRRITABLE BOWEL SYNDROME WITHOUT DIARRHEA: ICD-10-CM

## 2023-09-11 ENCOUNTER — PATIENT OUTREACH (OUTPATIENT)
Dept: CARE COORDINATION | Facility: CLINIC | Age: 28
End: 2023-09-11
Payer: COMMERCIAL

## 2023-09-12 RX ORDER — SUCRALFATE 1 G/1
1 TABLET ORAL 2 TIMES DAILY
Qty: 50 TABLET | Refills: 0 | Status: SHIPPED | OUTPATIENT
Start: 2023-09-12 | End: 2023-12-15

## 2023-09-12 NOTE — TELEPHONE ENCOUNTER
Needs to be seen for refills. Shouldn't be needing this on a chronic basis.    No future appointments.

## 2023-09-13 NOTE — TELEPHONE ENCOUNTER
It's always a good idea to fast for a physical. But I'm not seeing anything that we need right now. She had a good cholesterol level last year.    Future Appointments   Date Time Provider Department Center   12/15/2023  8:20 AM Anne Whitehead MD [facility]

## 2023-09-13 NOTE — TELEPHONE ENCOUNTER
Called patient and relayed message from Dr. Whitehead, patient understands.    Patient asked to schedule annual preventative exam while on call. This was scheduled for 12/15/23. Patient wonders if she should come to this appointment fasting for any labs--please call patient at [phone] with provider response, OK to leave detailed voicemail.

## 2023-09-21 ENCOUNTER — PATIENT OUTREACH (OUTPATIENT)
Dept: FAMILY MEDICINE | Facility: CLINIC | Age: 28
End: 2023-09-21
Payer: COMMERCIAL

## 2023-10-09 ENCOUNTER — MYC MEDICAL ADVICE (OUTPATIENT)
Dept: FAMILY MEDICINE | Facility: CLINIC | Age: 28
End: 2023-10-09
Payer: COMMERCIAL

## 2023-10-09 DIAGNOSIS — E66.813 CLASS 3 SEVERE OBESITY DUE TO EXCESS CALORIES WITH SERIOUS COMORBIDITY AND BODY MASS INDEX (BMI) OF 40.0 TO 44.9 IN ADULT (H): Primary | ICD-10-CM

## 2023-10-09 DIAGNOSIS — E28.2 PCOS (POLYCYSTIC OVARIAN SYNDROME): ICD-10-CM

## 2023-10-09 DIAGNOSIS — E66.01 CLASS 3 SEVERE OBESITY DUE TO EXCESS CALORIES WITH SERIOUS COMORBIDITY AND BODY MASS INDEX (BMI) OF 40.0 TO 44.9 IN ADULT (H): Primary | ICD-10-CM

## 2023-11-26 ENCOUNTER — HEALTH MAINTENANCE LETTER (OUTPATIENT)
Age: 28
End: 2023-11-26

## 2023-12-03 DIAGNOSIS — Z30.9 ENCOUNTER FOR CONTRACEPTIVE MANAGEMENT, UNSPECIFIED: ICD-10-CM

## 2023-12-04 RX ORDER — ETONOGESTREL/ETHINYL ESTRADIOL .12-.015MG
RING, VAGINAL VAGINAL
Refills: 3 | OUTPATIENT
Start: 2023-12-04

## 2023-12-15 ENCOUNTER — OFFICE VISIT (OUTPATIENT)
Dept: FAMILY MEDICINE | Facility: CLINIC | Age: 28
End: 2023-12-15
Payer: COMMERCIAL

## 2023-12-15 VITALS
RESPIRATION RATE: 16 BRPM | TEMPERATURE: 97.6 F | DIASTOLIC BLOOD PRESSURE: 73 MMHG | HEIGHT: 63 IN | HEART RATE: 73 BPM | OXYGEN SATURATION: 96 % | WEIGHT: 210 LBS | BODY MASS INDEX: 37.21 KG/M2 | SYSTOLIC BLOOD PRESSURE: 103 MMHG

## 2023-12-15 DIAGNOSIS — B37.31 VULVOVAGINITIS DUE TO YEAST: ICD-10-CM

## 2023-12-15 DIAGNOSIS — Z00.00 ROUTINE GENERAL MEDICAL EXAMINATION AT A HEALTH CARE FACILITY: Primary | ICD-10-CM

## 2023-12-15 DIAGNOSIS — Z12.4 CERVICAL CANCER SCREENING: ICD-10-CM

## 2023-12-15 DIAGNOSIS — E66.813 CLASS 3 SEVERE OBESITY DUE TO EXCESS CALORIES WITH SERIOUS COMORBIDITY AND BODY MASS INDEX (BMI) OF 40.0 TO 44.9 IN ADULT (H): ICD-10-CM

## 2023-12-15 DIAGNOSIS — Z30.44 ENCOUNTER FOR SURVEILLANCE OF VAGINAL RING HORMONAL CONTRACEPTIVE DEVICE: ICD-10-CM

## 2023-12-15 DIAGNOSIS — Z91.09 ENVIRONMENTAL ALLERGIES: ICD-10-CM

## 2023-12-15 DIAGNOSIS — K58.9 IRRITABLE BOWEL SYNDROME WITHOUT DIARRHEA: ICD-10-CM

## 2023-12-15 DIAGNOSIS — F33.8 SEASONAL AFFECTIVE DISORDER (H): ICD-10-CM

## 2023-12-15 DIAGNOSIS — R11.0 NAUSEA: ICD-10-CM

## 2023-12-15 DIAGNOSIS — E66.01 CLASS 3 SEVERE OBESITY DUE TO EXCESS CALORIES WITH SERIOUS COMORBIDITY AND BODY MASS INDEX (BMI) OF 40.0 TO 44.9 IN ADULT (H): ICD-10-CM

## 2023-12-15 DIAGNOSIS — F41.1 GENERALIZED ANXIETY DISORDER: ICD-10-CM

## 2023-12-15 LAB
CHOLEST SERPL-MCNC: 206 MG/DL
CLUE CELLS: ABNORMAL
FASTING STATUS PATIENT QL REPORTED: YES
HBA1C MFR BLD: 5.2 % (ref 0–5.6)
HDLC SERPL-MCNC: 55 MG/DL
LDLC SERPL CALC-MCNC: 125 MG/DL
NONHDLC SERPL-MCNC: 151 MG/DL
TRICHOMONAS, WET PREP: ABNORMAL
TRIGL SERPL-MCNC: 132 MG/DL
WBC'S/HIGH POWER FIELD, WET PREP: ABNORMAL
YEAST, WET PREP: PRESENT

## 2023-12-15 PROCEDURE — 83036 HEMOGLOBIN GLYCOSYLATED A1C: CPT | Performed by: FAMILY MEDICINE

## 2023-12-15 PROCEDURE — 90480 ADMN SARSCOV2 VAC 1/ONLY CMP: CPT | Performed by: FAMILY MEDICINE

## 2023-12-15 PROCEDURE — 87210 SMEAR WET MOUNT SALINE/INK: CPT | Performed by: FAMILY MEDICINE

## 2023-12-15 PROCEDURE — G0145 SCR C/V CYTO,THINLAYER,RESCR: HCPCS | Performed by: FAMILY MEDICINE

## 2023-12-15 PROCEDURE — 99395 PREV VISIT EST AGE 18-39: CPT | Mod: 25 | Performed by: FAMILY MEDICINE

## 2023-12-15 PROCEDURE — 87591 N.GONORRHOEAE DNA AMP PROB: CPT | Performed by: FAMILY MEDICINE

## 2023-12-15 PROCEDURE — 87491 CHLMYD TRACH DNA AMP PROBE: CPT | Performed by: FAMILY MEDICINE

## 2023-12-15 PROCEDURE — 36415 COLL VENOUS BLD VENIPUNCTURE: CPT | Performed by: FAMILY MEDICINE

## 2023-12-15 PROCEDURE — 99214 OFFICE O/P EST MOD 30 MIN: CPT | Mod: 25 | Performed by: FAMILY MEDICINE

## 2023-12-15 PROCEDURE — 91320 SARSCV2 VAC 30MCG TRS-SUC IM: CPT | Performed by: FAMILY MEDICINE

## 2023-12-15 PROCEDURE — 80061 LIPID PANEL: CPT | Performed by: FAMILY MEDICINE

## 2023-12-15 PROCEDURE — 87624 HPV HI-RISK TYP POOLED RSLT: CPT | Performed by: FAMILY MEDICINE

## 2023-12-15 RX ORDER — CETIRIZINE HYDROCHLORIDE 10 MG/1
10 TABLET ORAL DAILY
Qty: 90 TABLET | Refills: 4 | Status: SHIPPED | OUTPATIENT
Start: 2023-12-15

## 2023-12-15 RX ORDER — ETONOGESTREL AND ETHINYL ESTRADIOL VAGINAL RING .015; .12 MG/D; MG/D
1 RING VAGINAL
Qty: 3 EACH | Refills: 3 | Status: SHIPPED | OUTPATIENT
Start: 2023-12-15

## 2023-12-15 RX ORDER — DICYCLOMINE HCL 20 MG
20 TABLET ORAL 4 TIMES DAILY PRN
Qty: 90 TABLET | Refills: 4 | Status: SHIPPED | OUTPATIENT
Start: 2023-12-15

## 2023-12-15 RX ORDER — ESCITALOPRAM OXALATE 20 MG/1
20 TABLET ORAL DAILY
Qty: 90 TABLET | Refills: 3 | Status: SHIPPED | OUTPATIENT
Start: 2023-12-15

## 2023-12-15 RX ORDER — ONDANSETRON 4 MG/1
4 TABLET, ORALLY DISINTEGRATING ORAL EVERY 8 HOURS PRN
Qty: 30 TABLET | Refills: 1 | Status: SHIPPED | OUTPATIENT
Start: 2023-12-15

## 2023-12-15 RX ORDER — SUCRALFATE 1 G/1
1 TABLET ORAL DAILY
Qty: 90 TABLET | Refills: 4 | Status: SHIPPED | OUTPATIENT
Start: 2023-12-15

## 2023-12-15 RX ORDER — FLUCONAZOLE 150 MG/1
150 TABLET ORAL
Qty: 6 TABLET | Refills: 0 | Status: SHIPPED | OUTPATIENT
Start: 2023-12-15

## 2023-12-15 ASSESSMENT — ENCOUNTER SYMPTOMS
MYALGIAS: 0
CONSTIPATION: 0
DIARRHEA: 0
ABDOMINAL PAIN: 0
PARESTHESIAS: 0
FREQUENCY: 0
HEARTBURN: 0
JOINT SWELLING: 0
SORE THROAT: 0
BREAST MASS: 0
WEAKNESS: 0
HEADACHES: 0
CHILLS: 0
EYE PAIN: 0
NERVOUS/ANXIOUS: 0
SHORTNESS OF BREATH: 0
HEMATURIA: 0
ARTHRALGIAS: 0
FEVER: 0
NAUSEA: 0
HEMATOCHEZIA: 0
COUGH: 0
DYSURIA: 0
DIZZINESS: 0
PALPITATIONS: 0

## 2023-12-15 ASSESSMENT — PATIENT HEALTH QUESTIONNAIRE - PHQ9
SUM OF ALL RESPONSES TO PHQ QUESTIONS 1-9: 5
SUM OF ALL RESPONSES TO PHQ QUESTIONS 1-9: 5
10. IF YOU CHECKED OFF ANY PROBLEMS, HOW DIFFICULT HAVE THESE PROBLEMS MADE IT FOR YOU TO DO YOUR WORK, TAKE CARE OF THINGS AT HOME, OR GET ALONG WITH OTHER PEOPLE: SOMEWHAT DIFFICULT

## 2023-12-15 NOTE — PROGRESS NOTES
SUBJECTIVE:   Melissa is a 28 year old, presenting for the following:  Physical    Wegovy going well. Lost #70.  Sucralfate - if doesn't take gets burning in stomach/esophagus. Long history of abdominal pain - last EGD  - mild gastritis.   Vulvar fissures, recurrent - Jul, Aug, Oct, and . Tried: Estradiol and hydrocortisone. Stopped using baby wipes on vulva.         12/15/2023     7:57 AM   Additional Questions   Roomed by hser   Accompanied by self       Healthy Habits:     Getting at least 3 servings of Calcium per day:  NO    Bi-annual eye exam:  Yes    Dental care twice a year:  Yes    Sleep apnea or symptoms of sleep apnea:  None    Diet:  Regular (no restrictions)    Frequency of exercise:  None    Taking medications regularly:  No    Barriers to taking medications:  Problems remembering to take them    Medication side effects:  None    Additional concerns today:  Yes    Today's PHQ-9 Score:       12/15/2023     7:48 AM   PHQ-9 SCORE   PHQ-9 Total Score MyChart 5 (Mild depression)   PHQ-9 Total Score 5   Working with therapist, doing MUCH better than 5 years ago.   Have you ever done Advance Care Planning? (For example, a Health Directive, POLST, or a discussion with a medical provider or your loved ones about your wishes): No, advance care planning information given to patient to review.  Patient declined advance care planning discussion at this time.    Social History     Tobacco Use    Smoking status: Never     Passive exposure: Never    Smokeless tobacco: Never    Tobacco comments:     no passive exposure   Substance Use Topics    Alcohol use: Yes     Comment: Alcoholic Drinks/day: 4 16oz drinks per month.             12/15/2023     7:50 AM   Alcohol Use   Prescreen: >3 drinks/day or >7 drinks/week? Not Applicable     Reviewed orders with patient.  Reviewed health maintenance and updated orders accordingly - Yes    Breast Cancer Screenin/28/2022     8:52 AM   Breast CA Risk Assessment  (FHS-7)   Do you have a family history of breast, colon, or ovarian cancer? No / Unknown   Pertinent mammograms are reviewed under the imaging tab.  History of abnormal Pap smear: YES - updated in Problem List and Health Maintenance accordingly      Latest Ref Rng & Units 10/10/2022    12:03 PM 1/28/2022     9:57 AM 4/23/2021     8:40 AM   PAP / HPV   PAP  Negative for Intraepithelial Lesion or Malignancy (NILM)  Negative for Intraepithelial Lesion or Malignancy (NILM)  Atypical squamous cells of undetermined significance  Electronically signed by Chinedu Appiah MD on 4/30/2021 at  9:36 AM      HPV 16 DNA Negative Negative  Negative  Negative    HPV 18 DNA Negative Negative  Negative  Negative    Other HR HPV Negative Negative  Negative  Negative      Reviewed and updated as needed this visit by clinical staff   Tobacco  Allergies  Meds              Reviewed and updated as needed this visit by Provider               Review of Systems   Constitutional:  Negative for chills and fever.   HENT:  Negative for congestion, ear pain, hearing loss and sore throat.    Eyes:  Negative for pain and visual disturbance.   Respiratory:  Negative for cough and shortness of breath.    Cardiovascular:  Negative for chest pain, palpitations and peripheral edema.   Gastrointestinal:  Negative for abdominal pain, constipation, diarrhea, heartburn, hematochezia and nausea.   Breasts:  Negative for tenderness, breast mass and discharge.   Genitourinary:  Negative for dysuria, frequency, genital sores, hematuria, pelvic pain, urgency, vaginal bleeding and vaginal discharge.   Musculoskeletal:  Negative for arthralgias, joint swelling and myalgias.   Skin:  Negative for rash.   Neurological:  Negative for dizziness, weakness, headaches and paresthesias.   Psychiatric/Behavioral:  Negative for mood changes. The patient is not nervous/anxious.        OBJECTIVE:   /73 (BP Location: Left arm, Patient Position: Sitting, Cuff Size:  "Adult Large)   Pulse 73   Temp 97.6  F (36.4  C) (Temporal)   Resp 16   Ht 1.59 m (5' 2.6\")   Wt 95.3 kg (210 lb)   LMP  (LMP Unknown)   SpO2 96%   BMI 37.68 kg/m    Physical Exam  GENERAL: healthy, alert and no distress  EYES: Eyes grossly normal to inspection, PERRL and conjunctivae and sclerae normal  HENT: ear canals and TM's normal, nose and mouth without ulcers or lesions  NECK: no adenopathy, no asymmetry, masses, or scars and thyroid normal to palpation  RESP: lungs clear to auscultation - no rales, rhonchi or wheezes  BREAST: normal without masses, tenderness or nipple discharge and no palpable axillary masses or adenopathy  CV: regular rate and rhythm, normal S1 S2, no S3 or S4, no murmur, click or rub, no peripheral edema and peripheral pulses strong  ABDOMEN: soft, nontender, no hepatosplenomegaly, no masses and bowel sounds normal   (female): normal female external genitalia, normal urethral meatus, vaginal mucosa pink, moist, well rugated, and abnormal cervix with beefy red tissue around cervical os.   MS: no gross musculoskeletal defects noted, no edema  SKIN: no suspicious lesions or rashes  NEURO: Normal strength and tone, mentation intact and speech normal  PSYCH: mentation appears normal, affect normal/bright    Diagnostic Test Results:  Labs reviewed in Epic    ASSESSMENT/PLAN:   Melissa was seen today for physical.    Diagnoses and all orders for this visit:    Routine general medical examination at a health care facility  -     Chlamydia trachomatis/Neisseria gonorrhoeae by PCR - Clinic Collect  -     Wet prep - Clinic Collect    Cervical cancer screening  -     Pap Screen reflex to HPV if ASCUS - recommend age 25 - 29    Irritable bowel syndrome without diarrhea  -     sucralfate (CARAFATE) 1 GM tablet; Take 1 tablet (1 g) by mouth daily    Generalized anxiety disorder  -     escitalopram (LEXAPRO) 20 MG tablet; Take 1 tablet (20 mg) by mouth daily    Nausea  -     ondansetron (ZOFRAN " "ODT) 4 MG ODT tab; Take 1 tablet (4 mg) by mouth every 8 hours as needed for nausea  -     dicyclomine (BENTYL) 20 MG tablet; Take 1 tablet (20 mg) by mouth 4 times daily as needed (nausea and IBS)    Encounter for surveillance of vaginal ring hormonal contraceptive device  -     etonogestrel-ethinyl estradiol (NUVARING) 0.12-0.015 MG/24HR vaginal ring; Place 1 each vaginally every 28 days Insert one (1) ring vaginally and leave in place for 3 consecutive weeks (21 days), then remove for 1 week.    Seasonal affective disorder (H24)    Environmental allergies  -     cetirizine (ZYRTEC) 10 MG tablet; Take 1 tablet (10 mg) by mouth daily    Class 3 severe obesity due to excess calories with serious comorbidity and body mass index (BMI) of 40.0 to 44.9 in adult (H)  -     Hemoglobin A1c; Future  -     Lipid panel reflex to direct LDL Fasting; Future  -     Hemoglobin A1c  -     Lipid panel reflex to direct LDL Fasting    Vulvovaginitis due to yeast  -     fluconazole (DIFLUCAN) 150 MG tablet; Take 1 tablet (150 mg) by mouth once as needed (yeast vulvitis)    Other orders  -     COVID-19 12+ (2023-24) (PFIZER)  -     PRIMARY CARE FOLLOW-UP SCHEDULING; Future        COUNSELING:  Reviewed preventive health counseling, as reflected in patient instructions      BMI:   Estimated body mass index is 37.68 kg/m  as calculated from the following:    Height as of this encounter: 1.59 m (5' 2.6\").    Weight as of this encounter: 95.3 kg (210 lb).   Weight management plan: Discussed healthy diet and exercise guidelines      She reports that she has never smoked. She has never been exposed to tobacco smoke. She has never used smokeless tobacco.        Anne Whitehead MD  [facility]      Prior to immunization administration, verified patients identity using patient s name and date of birth. Please see Immunization Activity for additional information.     Screening Questionnaire for Adult Immunization    Are " you sick today?   No   Do you have allergies to medications, food, a vaccine component or latex?   No   Have you ever had a serious reaction after receiving a vaccination?   No   Do you have a long-term health problem with heart, lung, kidney, or metabolic disease (e.g., diabetes), asthma, a blood disorder, no spleen, complement component deficiency, a cochlear implant, or a spinal fluid leak?  Are you on long-term aspirin therapy?   No   Do you have cancer, leukemia, HIV/AIDS, or any other immune system problem?   No   Do you have a parent, brother, or sister with an immune system problem?   No   In the past 3 months, have you taken medications that affect  your immune system, such as prednisone, other steroids, or anticancer drugs; drugs for the treatment of rheumatoid arthritis, Crohn s disease, or psoriasis; or have you had radiation treatments?   No   Have you had a seizure, or a brain or other nervous system problem?   No   During the past year, have you received a transfusion of blood or blood    products, or been given immune (gamma) globulin or antiviral drug?   No   For women: Are you pregnant or is there a chance you could become       pregnant during the next month?   No   Have you received any vaccinations in the past 4 weeks?   No     Immunization questionnaire answers were all negative.      Patient instructed to remain in clinic for 15 minutes afterwards, and to report any adverse reactions.     Screening performed by Lissa Bernardo MA on 12/15/2023 at 8:00 AM.       Answers submitted by the patient for this visit:  Patient Health Questionnaire (Submitted on 12/15/2023)  If you checked off any problems, how difficult have these problems made it for you to do your work, take care of things at home, or get along with other people?: Somewhat difficult  PHQ9 TOTAL SCORE: 5

## 2023-12-16 LAB
C TRACH DNA SPEC QL PROBE+SIG AMP: NEGATIVE
N GONORRHOEA DNA SPEC QL NAA+PROBE: NEGATIVE

## 2023-12-19 LAB
BKR LAB AP GYN ADEQUACY: NORMAL
BKR LAB AP GYN INTERPRETATION: NORMAL
BKR LAB AP HPV REFLEX: NORMAL
BKR LAB AP PREVIOUS ABNL DX: NORMAL
BKR LAB AP PREVIOUS ABNORMAL: NORMAL
PATH REPORT.COMMENTS IMP SPEC: NORMAL
PATH REPORT.COMMENTS IMP SPEC: NORMAL
PATH REPORT.RELEVANT HX SPEC: NORMAL

## 2023-12-21 ENCOUNTER — PATIENT OUTREACH (OUTPATIENT)
Dept: FAMILY MEDICINE | Facility: CLINIC | Age: 28
End: 2023-12-21
Payer: COMMERCIAL

## 2024-01-05 PROBLEM — E78.5 HYPERLIPIDEMIA LDL GOAL <130: Status: ACTIVE | Noted: 2024-01-05

## 2024-01-31 DIAGNOSIS — E66.813 CLASS 3 SEVERE OBESITY DUE TO EXCESS CALORIES WITH SERIOUS COMORBIDITY AND BODY MASS INDEX (BMI) OF 40.0 TO 44.9 IN ADULT (H): ICD-10-CM

## 2024-01-31 DIAGNOSIS — E66.01 CLASS 3 SEVERE OBESITY DUE TO EXCESS CALORIES WITH SERIOUS COMORBIDITY AND BODY MASS INDEX (BMI) OF 40.0 TO 44.9 IN ADULT (H): ICD-10-CM

## 2024-01-31 DIAGNOSIS — E28.2 PCOS (POLYCYSTIC OVARIAN SYNDROME): ICD-10-CM

## 2024-01-31 RX ORDER — SEMAGLUTIDE 2.4 MG/.75ML
2.4 INJECTION, SOLUTION SUBCUTANEOUS WEEKLY
Qty: 9 ML | Refills: 4 | Status: SHIPPED | OUTPATIENT
Start: 2024-01-31

## 2024-03-06 ENCOUNTER — VIRTUAL VISIT (OUTPATIENT)
Dept: FAMILY MEDICINE | Facility: CLINIC | Age: 29
End: 2024-03-06
Payer: COMMERCIAL

## 2024-03-06 DIAGNOSIS — E28.2 PCOS (POLYCYSTIC OVARIAN SYNDROME): ICD-10-CM

## 2024-03-06 DIAGNOSIS — E66.01 CLASS 2 SEVERE OBESITY DUE TO EXCESS CALORIES WITH SERIOUS COMORBIDITY AND BODY MASS INDEX (BMI) OF 37.0 TO 37.9 IN ADULT (H): Primary | ICD-10-CM

## 2024-03-06 DIAGNOSIS — E78.5 HYPERLIPIDEMIA LDL GOAL <130: ICD-10-CM

## 2024-03-06 DIAGNOSIS — E66.812 CLASS 2 SEVERE OBESITY DUE TO EXCESS CALORIES WITH SERIOUS COMORBIDITY AND BODY MASS INDEX (BMI) OF 37.0 TO 37.9 IN ADULT (H): Primary | ICD-10-CM

## 2024-03-06 DIAGNOSIS — F12.20 CANNABIS DEPENDENCE, UNCOMPLICATED (H): ICD-10-CM

## 2024-03-06 DIAGNOSIS — D33.3 BENIGN NEOPLASM OF CRANIAL NERVE (H): ICD-10-CM

## 2024-03-06 DIAGNOSIS — F33.1 MODERATE RECURRENT MAJOR DEPRESSION (H): ICD-10-CM

## 2024-03-06 PROCEDURE — 99213 OFFICE O/P EST LOW 20 MIN: CPT | Mod: 95 | Performed by: FAMILY MEDICINE
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
## 2024-03-06 NOTE — ASSESSMENT & PLAN NOTE
28 year old year old female in clinic today to discuss treatment of the following conditions through diet and lifestyle modification and weight loss:  1. Class 2 severe obesity due to excess calories with serious comorbidity and body mass index (BMI) of 37.0 to 37.9 in adult (H)    2. Hyperlipidemia LDL goal <130    3. PCOS (polycystic ovarian syndrome)      The patient's weight loss result since the last visit was successful based on weight loss maintenance.  Nutritional quality generally varies but portions have been controlled.  Reviewed exercise.  Currently limited by PT.  Consider trial of zepbound?  Currently not covered by [facility] insurance but could appeal.      BMI: There is no height or weight on file to calculate BMI.  Ideal body weight: 51.5 kg (113 lb 7.9 oz)  Adjusted ideal body weight: 69 kg (152 lb 1.5 oz)    Current therapies:    Medications: YES wegovy   - Starting weight for GLP1 receptor agonist: 236 lbs   - Total weight loss: ~93 lbs lbs (35%)    Nutritional goals/strategies: reviewed.   - caloric restriction: Yes   - time restriction: NO   - diet (macronutrient) framework: high protein/low carbohydrate     Movement:   - predominantly cardiovascular

## 2024-03-08 ENCOUNTER — LAB REQUISITION (OUTPATIENT)
Dept: LAB | Facility: CLINIC | Age: 29
End: 2024-03-08

## 2024-03-08 DIAGNOSIS — Z12.4 ENCOUNTER FOR SCREENING FOR MALIGNANT NEOPLASM OF CERVIX: ICD-10-CM

## 2024-03-08 PROCEDURE — 88141 CYTOPATH C/V INTERPRET: CPT

## 2024-03-08 PROCEDURE — 88175 CYTOPATH C/V AUTO FLUID REDO: CPT | Performed by: OBSTETRICS & GYNECOLOGY

## 2024-03-08 PROCEDURE — 87624 HPV HI-RISK TYP POOLED RSLT: CPT | Performed by: OBSTETRICS & GYNECOLOGY

## 2024-03-14 LAB
BKR LAB AP GYN ADEQUACY: ABNORMAL
BKR LAB AP GYN INTERPRETATION: ABNORMAL
BKR LAB AP HPV REFLEX: ABNORMAL
BKR LAB AP LMP: ABNORMAL
BKR LAB AP PREVIOUS ABNL DX: ABNORMAL
BKR LAB AP PREVIOUS ABNORMAL: ABNORMAL
PATH REPORT.COMMENTS IMP SPEC: ABNORMAL
PATH REPORT.COMMENTS IMP SPEC: ABNORMAL
PATH REPORT.RELEVANT HX SPEC: ABNORMAL

## 2024-04-30 ENCOUNTER — OFFICE VISIT (OUTPATIENT)
Dept: OPHTHALMOLOGY | Facility: CLINIC | Age: 29
End: 2024-04-30
Attending: OPHTHALMOLOGY
Payer: COMMERCIAL

## 2024-04-30 DIAGNOSIS — H53.40 VISUAL FIELD DEFECT: Primary | ICD-10-CM

## 2024-04-30 DIAGNOSIS — H44.521 PHTHISIS BULBI OF RIGHT EYE: Primary | ICD-10-CM

## 2024-04-30 DIAGNOSIS — C72.31: ICD-10-CM

## 2024-04-30 PROCEDURE — 99214 OFFICE O/P EST MOD 30 MIN: CPT | Performed by: OPHTHALMOLOGY

## 2024-04-30 PROCEDURE — 92133 CPTRZD OPH DX IMG PST SGM ON: CPT | Performed by: OPHTHALMOLOGY

## 2024-04-30 PROCEDURE — 92014 COMPRE OPH EXAM EST PT 1/>: CPT | Performed by: OPHTHALMOLOGY

## 2024-04-30 PROCEDURE — 92083 EXTENDED VISUAL FIELD XM: CPT | Performed by: OPHTHALMOLOGY

## 2024-04-30 RX ORDER — ATROPINE SULFATE 10 MG/ML
1 SOLUTION/ DROPS OPHTHALMIC DAILY
Qty: 15 ML | Refills: 11 | Status: SHIPPED | OUTPATIENT
Start: 2024-04-30

## 2024-04-30 RX ORDER — COPPER 313.4 MG/1
INTRAUTERINE DEVICE INTRAUTERINE
COMMUNITY
Start: 2024-03-08

## 2024-04-30 RX ORDER — LEVOCETIRIZINE DIHYDROCHLORIDE 5 MG/1
5 TABLET, FILM COATED ORAL EVERY EVENING
COMMUNITY

## 2024-04-30 ASSESSMENT — VISUAL ACUITY
OD_CC: NLP
CORRECTION_TYPE: GLASSES
METHOD: SNELLEN - LINEAR
OS_CC: 20/20

## 2024-04-30 ASSESSMENT — REFRACTION_WEARINGRX
OD_SPHERE: BALANCE
OS_CYLINDER: +1.25
SPECS_TYPE: SVL
OS_AXIS: 080
OS_SPHERE: +0.50

## 2024-04-30 ASSESSMENT — CONF VISUAL FIELD
OS_INFERIOR_TEMPORAL_RESTRICTION: 0
OD_INFERIOR_NASAL_RESTRICTION: 1
OS_NORMAL: 1
OD_SUPERIOR_TEMPORAL_RESTRICTION: 1
OD_SUPERIOR_NASAL_RESTRICTION: 1
METHOD: COUNTING FINGERS
OS_SUPERIOR_TEMPORAL_RESTRICTION: 0
OS_INFERIOR_NASAL_RESTRICTION: 0
OD_INFERIOR_TEMPORAL_RESTRICTION: 1
OS_SUPERIOR_NASAL_RESTRICTION: 0

## 2024-04-30 ASSESSMENT — TONOMETRY
OS_IOP_MMHG: 10
OD_IOP_MMHG: 03
IOP_METHOD: ICARE

## 2024-04-30 ASSESSMENT — CUP TO DISC RATIO
OS_RATIO: 0.6
OD_RATIO: NO VIEW

## 2024-04-30 ASSESSMENT — EXTERNAL EXAM - LEFT EYE: OS_EXAM: NORMAL

## 2024-04-30 ASSESSMENT — SLIT LAMP EXAM - LIDS
COMMENTS: NORMAL
COMMENTS: PTOSIS

## 2024-04-30 NOTE — NURSING NOTE
Chief Complaint(s) and History of Present Illness(es)       Annual Eye Exam    In both eyes.  Since onset it is stable.  Associated symptoms include eye pain, flashes and floaters.  Negative for headache.  Pain was noted as 0/10. 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 Has been having some mild right eye pain. Did have a day where she had right eye pain for about 30 minutes. More frequent episodes of flashes, gets floaters weekly.  No headaches.   RENATE Ferris 4/30/2024 7:55 AM

## 2024-04-30 NOTE — PROGRESS NOTES
Assessment & Plan     Melissa Eastman is a 28 year old female with the following diagnoses:   1. 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 She has enlargement of the  right optic nerve and chiasm. Last visit was January 2023. She had one episode of right eye pain x 30 min. She is using atropine 1% as needed.     Her visual acuity is no light perception right eye 20/20 left eye.  She has phthisis of the right eye.  The left eye looks normal.  The visual field in the left eye is normal.  OCT of the RNFL is normal in the left eye.    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  She has enlargement of the right optic nerve and chiasm.  This has been reported in the literature to occur with morning glory disc anomaly.  Her left eye appears normal without any involvement of the visual field or OCT.  Recommend follow-up 1 year sooner as needed.           Attending Physician Attestation:  Complete documentation of historical and exam elements from today's encounter can be found in the full encounter summary report (not reduplicated in this progress note).  I personally obtained the chief complaint(s) and history of present illness.  I confirmed and edited as necessary the review of systems, past medical/surgical history, family history, social history, and examination findings as documented by others; and I examined the patient myself.  I personally reviewed the relevant tests, images, and reports as documented above.  I formulated and edited as necessary the assessment and plan and discussed the findings and management plan with the patient and family. - Isaías Willis MD

## 2024-09-30 ENCOUNTER — MYC REFILL (OUTPATIENT)
Dept: FAMILY MEDICINE | Facility: CLINIC | Age: 29
End: 2024-09-30
Payer: COMMERCIAL

## 2024-09-30 DIAGNOSIS — E28.2 PCOS (POLYCYSTIC OVARIAN SYNDROME): ICD-10-CM

## 2024-09-30 DIAGNOSIS — E66.813 CLASS 3 SEVERE OBESITY DUE TO EXCESS CALORIES WITH SERIOUS COMORBIDITY AND BODY MASS INDEX (BMI) OF 40.0 TO 44.9 IN ADULT (H): ICD-10-CM

## 2024-09-30 DIAGNOSIS — E66.01 CLASS 3 SEVERE OBESITY DUE TO EXCESS CALORIES WITH SERIOUS COMORBIDITY AND BODY MASS INDEX (BMI) OF 40.0 TO 44.9 IN ADULT (H): ICD-10-CM

## 2024-09-30 RX ORDER — SEMAGLUTIDE 2.4 MG/.75ML
2.4 INJECTION, SOLUTION SUBCUTANEOUS WEEKLY
Qty: 9 ML | Refills: 4 | OUTPATIENT
Start: 2024-09-30

## 2024-10-01 ENCOUNTER — TELEPHONE (OUTPATIENT)
Dept: FAMILY MEDICINE | Facility: CLINIC | Age: 29
End: 2024-10-01
Payer: COMMERCIAL

## 2024-10-01 NOTE — TELEPHONE ENCOUNTER
Spoke with patient - Patient states the pharmacy resolved the issue and she no longer needs a refill of medication at this time. Patient verified she would like to continue to have [facility] follow her weight loss progress. Patient has scheduled follow up with [facility] 10/08/2024

## 2024-10-01 NOTE — TELEPHONE ENCOUNTER
Left message to call back for: Patient  Information to relay to patient: Please clarify patient message below. Wegovy was last prescribed by her PCP. Is she still seeing Dr. Ruiz? Does she need a refill or prior authorization? Patient is due for weight loss follow-up if she is continuing care with Dr. Resendiz.

## 2024-10-01 NOTE — TELEPHONE ENCOUNTER
From CRM MyChart message:  Topic: Non-Medical Question.     Hi my refill for my wegovy was denied because the insurance denied it? But my provider said they submitted the renewal so I am not sure what s going on. I appreciate any help with the situation thank you!

## 2024-10-08 ENCOUNTER — VIRTUAL VISIT (OUTPATIENT)
Dept: FAMILY MEDICINE | Facility: CLINIC | Age: 29
End: 2024-10-08
Payer: COMMERCIAL

## 2024-10-08 ENCOUNTER — ALLIED HEALTH/NURSE VISIT (OUTPATIENT)
Dept: FAMILY MEDICINE | Facility: CLINIC | Age: 29
End: 2024-10-08
Attending: FAMILY MEDICINE
Payer: COMMERCIAL

## 2024-10-08 VITALS — BODY MASS INDEX: 33.84 KG/M2 | WEIGHT: 191 LBS | HEIGHT: 63 IN

## 2024-10-08 DIAGNOSIS — E66.09 CLASS 1 OBESITY DUE TO EXCESS CALORIES WITH SERIOUS COMORBIDITY AND BODY MASS INDEX (BMI) OF 34.0 TO 34.9 IN ADULT: Primary | ICD-10-CM

## 2024-10-08 DIAGNOSIS — E28.2 PCOS (POLYCYSTIC OVARIAN SYNDROME): ICD-10-CM

## 2024-10-08 DIAGNOSIS — E78.5 HYPERLIPIDEMIA LDL GOAL <130: ICD-10-CM

## 2024-10-08 DIAGNOSIS — E66.811 CLASS 1 OBESITY DUE TO EXCESS CALORIES WITH SERIOUS COMORBIDITY AND BODY MASS INDEX (BMI) OF 34.0 TO 34.9 IN ADULT: Primary | ICD-10-CM

## 2024-10-08 DIAGNOSIS — R63.4 WEIGHT LOSS: Primary | ICD-10-CM

## 2024-10-08 PROCEDURE — G2211 COMPLEX E/M VISIT ADD ON: HCPCS | Mod: 95 | Performed by: FAMILY MEDICINE

## 2024-10-08 PROCEDURE — 99213 OFFICE O/P EST LOW 20 MIN: CPT | Mod: 95 | Performed by: FAMILY MEDICINE

## 2024-10-08 PROCEDURE — 99207 PR NO CHARGE NURSE ONLY: CPT

## 2024-10-08 RX ORDER — SEMAGLUTIDE 2.4 MG/.75ML
2.4 INJECTION, SOLUTION SUBCUTANEOUS WEEKLY
Qty: 9 ML | Refills: 2 | Status: SHIPPED | OUTPATIENT
Start: 2024-10-08

## 2024-10-08 NOTE — PROGRESS NOTES
Return Medical Weight Management Note     Melissa Eastman  MRN:  [id number]  :  1995  DEBRA:  10/8/2024    Dear Anne Whitehead MD,    I had the pleasure of seeing your patient Melissa Eastman. She is a 28 year old female who I am continuing to see for treatment of obesity related to:        2022     3:04 PM   --   I have the following health issues associated with obesity None of the above   I have the following symptoms associated with obesity None of the above       INTERVAL HISTORY:  ***    CURRENT WEIGHT:   0 lbs 0 oz                      10/8/2024     7:02 AM   Changes and Difficulties   I have made the following changes to my diet since my last visit: none   I have made the following changes to my activity/exercise since my last visit: walking a little bit more   With regards to my activity/exercise, I am still struggling with: Hard to do exercise by myself       VITALS:  LMP  (LMP Unknown)     MEDICATIONS:   Current Outpatient Medications   Medication Sig Dispense Refill    atropine 1 % ophthalmic solution Place 1 drop into the right eye daily 15 mL 11    B Complex Vitamins (VITAMIN-B COMPLEX PO)       cetirizine (ZYRTEC) 10 MG tablet Take 1 tablet (10 mg) by mouth daily 90 tablet 4    Coenzyme Q10 (COQ-10 PO) Otc      dicyclomine (BENTYL) 20 MG tablet Take 1 tablet (20 mg) by mouth 4 times daily as needed (nausea and IBS) 90 tablet 4    escitalopram (LEXAPRO) 20 MG tablet Take 1 tablet (20 mg) by mouth daily 90 tablet 3    FIBER PO       fluconazole (DIFLUCAN) 150 MG tablet Take 1 tablet (150 mg) by mouth once as needed (yeast vulvitis) 6 tablet 0    Ginger, Zingiber officinalis, (GINGER PO) Take by mouth daily      levocetirizine (XYZAL) 5 MG tablet Take 5 mg by mouth every evening      MAGNESIUM GLYCINATE PO       MULTIPLE VITAMIN PO       ondansetron (ZOFRAN ODT) 4 MG ODT tab Take 1 tablet (4 mg) by mouth every 8 hours as needed for nausea 30 tablet 1    paragard intrauterine copper  device Take 1 device by intrauterine route.      Probiotic Product (PROBIOTIC-10 PO) Take by mouth daily      Semaglutide-Weight Management (WEGOVY) 2.4 MG/0.75ML pen INJECT 2.4 MG SUBCUTANEOUS ONCE A WEEK 9 mL 4    sucralfate (CARAFATE) 1 GM tablet Take 1 tablet (1 g) by mouth daily 90 tablet 4    TURMERIC PO       UNABLE TO FIND OMEGA-3 PLUS VITAMIN D3      UNABLE TO FIND daily MEDICATION NAME: Psyllium Husks CAPS      UNABLE TO FIND daily MEDICATION NAME: Super Papaya Enzyme Plus      UNABLE TO FIND MEDICATION NAME: [place] Natural      atropine 1 % ophthalmic solution Place 1 drop into the right eye daily (Needing updated visit before the next refill.  Please call the clinic at [phone] for a visit.) 5 mL 0    etonogestrel-ethinyl estradiol (NUVARING) 0.12-0.015 MG/24HR vaginal ring Place 1 each vaginally every 28 days Insert one (1) ring vaginally and leave in place for 3 consecutive weeks (21 days), then remove for 1 week. (Patient not taking: Reported on 10/8/2024) 3 each 3    Rukhsana, Zingiber officinalis, (RUKHSANA PO)       Magnesium Bisglycinate (MAG GLYCINATE PO)       Multiple Vitamins-Minerals ([facility] DAILY MULTIPLE FOR WOMEN PO)       Probiotic Product (PROBIOTIC PO)              10/8/2024     7:02 AM   Weight Loss Medication History Reviewed With Patient   Which weight loss medications are you currently taking on a regular basis? Wegovy   If you are not taking a weight loss medication that was prescribed to you, please indicate why: Other   Are you having any side effects from the weight loss medication that we have prescribed you? Yes   If you are having side effects please describe: Nausea, Vomiting       ASSESSMENT:   ***    PLAN:   {WT MGMT SOLUTIONS:616633}  ***    FOLLOW-UP:    {WT MGMT RTC:982423}.    Time: *** min spent on evaluation, management, counseling, education, & motivational interviewing with greater than 50 % of the total time was spent on counseling and coordinating  care    Sincerely,    Dom Resendiz MD    Return Medical Weight Management Note     Melissa Eastman  MRN:  [id number]  :  1995  DEBRA:  10/8/2024    Dear Anne Whitehead MD,    I had the pleasure of seeing your patient Melissa Eastman. She is a 28 year old female who I am continuing to see for treatment of obesity related to:        2022     3:04 PM   --   I have the following health issues associated with obesity None of the above   I have the following symptoms associated with obesity None of the above       INTERVAL HISTORY:  ***    CURRENT WEIGHT:   0 lbs 0 oz                      10/8/2024     7:02 AM   Changes and Difficulties   I have made the following changes to my diet since my last visit: none   I have made the following changes to my activity/exercise since my last visit: walking a little bit more   With regards to my activity/exercise, I am still struggling with: Hard to do exercise by myself       VITALS:  LMP  (LMP Unknown)     MEDICATIONS:   Current Outpatient Medications   Medication Sig Dispense Refill    atropine 1 % ophthalmic solution Place 1 drop into the right eye daily 15 mL 11    B Complex Vitamins (VITAMIN-B COMPLEX PO)       cetirizine (ZYRTEC) 10 MG tablet Take 1 tablet (10 mg) by mouth daily 90 tablet 4    Coenzyme Q10 (COQ-10 PO) Otc      dicyclomine (BENTYL) 20 MG tablet Take 1 tablet (20 mg) by mouth 4 times daily as needed (nausea and IBS) 90 tablet 4    escitalopram (LEXAPRO) 20 MG tablet Take 1 tablet (20 mg) by mouth daily 90 tablet 3    FIBER PO       fluconazole (DIFLUCAN) 150 MG tablet Take 1 tablet (150 mg) by mouth once as needed (yeast vulvitis) 6 tablet 0    Ginger, Zingiber officinalis, (GINGER PO) Take by mouth daily      levocetirizine (XYZAL) 5 MG tablet Take 5 mg by mouth every evening      MAGNESIUM GLYCINATE PO       MULTIPLE VITAMIN PO       ondansetron (ZOFRAN ODT) 4 MG ODT tab Take 1 tablet (4 mg) by mouth every 8 hours as needed for nausea 30  tablet 1    paragard intrauterine copper device Take 1 device by intrauterine route.      Probiotic Product (PROBIOTIC-10 PO) Take by mouth daily      Semaglutide-Weight Management (WEGOVY) 2.4 MG/0.75ML pen INJECT 2.4 MG SUBCUTANEOUS ONCE A WEEK 9 mL 4    sucralfate (CARAFATE) 1 GM tablet Take 1 tablet (1 g) by mouth daily 90 tablet 4    TURMERIC PO       UNABLE TO FIND OMEGA-3 PLUS VITAMIN D3      UNABLE TO FIND daily MEDICATION NAME: Psyllium Husks CAPS      UNABLE TO FIND daily MEDICATION NAME: Super Papaya Enzyme Plus      UNABLE TO FIND MEDICATION NAME: [place] Natural      atropine 1 % ophthalmic solution Place 1 drop into the right eye daily (Needing updated visit before the next refill.  Please call the clinic at [phone] for a visit.) 5 mL 0    etonogestrel-ethinyl estradiol (NUVARING) 0.12-0.015 MG/24HR vaginal ring Place 1 each vaginally every 28 days Insert one (1) ring vaginally and leave in place for 3 consecutive weeks (21 days), then remove for 1 week. (Patient not taking: Reported on 10/8/2024) 3 each 3    Rukhsana, Zingiber officinalis, (RUKHSANA PO)       Magnesium Bisglycinate (MAG GLYCINATE PO)       Multiple Vitamins-Minerals ([facility] DAILY MULTIPLE FOR WOMEN PO)       Probiotic Product (PROBIOTIC PO)              10/8/2024     7:02 AM   Weight Loss Medication History Reviewed With Patient   Which weight loss medications are you currently taking on a regular basis? Wegovy   If you are not taking a weight loss medication that was prescribed to you, please indicate why: Other   Are you having any side effects from the weight loss medication that we have prescribed you? Yes   If you are having side effects please describe: Nausea, Vomiting       ASSESSMENT:   ***    PLAN:   {WT MGMT SOLUTIONS:296479}  ***    FOLLOW-UP:    {WT MGMT RTC:429549}.    Time: *** min spent on evaluation, management, counseling, education, & motivational interviewing with greater than 50 % of the total time was spent on  counseling and coordinating care    Sincerely,    Dom Resendiz MD

## 2024-10-08 NOTE — ASSESSMENT & PLAN NOTE
28 year old year old female in clinic today to discuss treatment of the following conditions through diet and lifestyle modification and weight loss:  1. Class 1 obesity due to excess calories with serious comorbidity and body mass index (BMI) of 34.0 to 34.9 in adult    2. Hyperlipidemia LDL goal <130    3. PCOS (polycystic ovarian syndrome)      The patient's weight loss result since the last visit was successful based on maintenance of weight loss.  Her vomiting has gotten better but she still has nausea.      BMI: There is no height or weight on file to calculate BMI.  Ideal body weight: 51.5 kg (113 lb 7.9 oz)  Adjusted ideal body weight: 69 kg (152 lb 1.5 oz)    Current therapies:    Medications: YES Saxenda was not tolerated.  Wegovy is barely tolerated. Discussed switch to zepbound.  Reviewed nutrition and exercise as it relates to lean mass.  At some point she could shift to zepbound.  She needs scale measurement for approval. I have asked her to stop by a clinic.   - Starting weight for GLP1 receptor agonist: 256 lbs   - Total weight loss: 66 lbs (25%)    Nutritional goals/strategies: reviewed.   - caloric restriction: Yes   - time restriction: NO   - diet (macronutrient) framework: high protein/low carbohydrate     Movement:   - minimal.  Now walking some days.  She wants to do more movement and exercise.  Dancing?     Follow-up: 3 months

## 2024-12-23 ENCOUNTER — OFFICE VISIT (OUTPATIENT)
Dept: URGENT CARE | Facility: URGENT CARE | Age: 29
End: 2024-12-23
Payer: COMMERCIAL

## 2024-12-23 VITALS
RESPIRATION RATE: 19 BRPM | SYSTOLIC BLOOD PRESSURE: 119 MMHG | OXYGEN SATURATION: 99 % | WEIGHT: 184 LBS | DIASTOLIC BLOOD PRESSURE: 84 MMHG | BODY MASS INDEX: 33.12 KG/M2 | HEART RATE: 92 BPM | TEMPERATURE: 98.2 F

## 2024-12-23 DIAGNOSIS — R11.10 VOMITING, UNSPECIFIED VOMITING TYPE, UNSPECIFIED WHETHER NAUSEA PRESENT: ICD-10-CM

## 2024-12-23 DIAGNOSIS — R19.7 DIARRHEA, UNSPECIFIED TYPE: Primary | ICD-10-CM

## 2024-12-23 PROCEDURE — 99213 OFFICE O/P EST LOW 20 MIN: CPT | Performed by: PHYSICIAN ASSISTANT

## 2024-12-23 RX ORDER — ONDANSETRON 8 MG/1
8 TABLET, ORALLY DISINTEGRATING ORAL ONCE
Status: COMPLETED | OUTPATIENT
Start: 2024-12-23 | End: 2024-12-23

## 2024-12-23 RX ORDER — ONDANSETRON 4 MG/1
8 TABLET, ORALLY DISINTEGRATING ORAL EVERY 8 HOURS PRN
Qty: 15 TABLET | Refills: 0 | Status: SHIPPED | OUTPATIENT
Start: 2024-12-23

## 2024-12-23 RX ADMIN — ONDANSETRON 8 MG: 8 TABLET, ORALLY DISINTEGRATING ORAL at 14:26

## 2024-12-23 NOTE — PATIENT INSTRUCTIONS
Push fluids as discussed.    Do stool cultures if greater than 10-14 days or severe sx over the next  several days.    Return for signs of dehydration, high fevers, bloody diarrhea.

## 2024-12-23 NOTE — PROGRESS NOTES
Assessment & Plan     Diarrhea, unspecified type: pt was seen for vomiting and diarrhea x 1 day. She is well hydrated. No acute abdomen on exam.  She is vitally normal.    Discussed continued supportive care including fluids, BRAT diet, advance as tolerated.  Given sx started after eating sushi consider enteric pathogens, plan to do stool testing if severe sx not improving over the next several days or persisting greater than 10-14 days.    - Enteric Bacteria and Virus Panel by [facility] Stool  - Enteric Bacteria and Virus Panel by [facility] Stool    Vomiting, unspecified vomiting type, unspecified whether nausea present  Zofran for vomiting.   Push fluids, rest and ibuprofen or tylenol for comfort.    Discussed likley viral along with the diarrhea but if persistent or worsening sx, signs of dehydration, abdomen pain should be reassessed.    - ondansetron (ZOFRAN ODT) 4 MG ODT tab  Dispense: 15 tablet; Refill: 0  - ondansetron (ZOFRAN ODT) ODT tab 8 mg        Janie Ball PA-C  [facility]    Nomi Torres is a 29 year old female who presents to clinic today for the following health issues:  Chief Complaint   Patient presents with    Vomiting     Started this morning, vomiting, weakness, diarrhea. On wegovy for a long time and she has never felt this way. Has been on the same dose for a while- doesn't think its this medication. Ate sushi last night. Hasn't been able to eat anything today. unusual abdominal cramping        HPI  TNTC vomiting from 9-11:30 this morning.  Starting to slow a bit.    Sips of water, nausea and vomiting.  Last emesis in our lobby.    Diarrhea: 4 x today.    Ate sushi last night.    No recent travel.    No recent exposure to animals of concern.    No recent abx in the last 3 months.    Cramping intermittant prior to vomiting, then improves some    No fevers.   No blood per rectum or in emesis.   No rash.    No others at home with similar sx.        Review of  Systems  Constitutional, HEENT, cardiovascular, pulmonary, gi and gu systems are negative, except as otherwise noted.      Objective    /84 (BP Location: Right arm, Patient Position: Sitting, Cuff Size: Adult Regular)   Pulse 92   Temp 98.2  F (36.8  C) (Oral)   Resp 19   Wt 83.5 kg (184 lb)   SpO2 99%   BMI 33.12 kg/m    Physical Exam   Pt is in no acute distress and appears well  Ears patent B:  TM s intact, non-injected. All land marks easily visibile    Nasal mucosa is non-edematous, no discharge.    Pharynx: non erythematous, tonsils non hypertrophied, No exudate   Neck supple: no adenopathy  Lungs: CTA  Heart: RRR, no murmur, no thrills or heaves   Ext: no edema  Skin: no rashes    Abdomen: BS Active, abdomen soft, non-distended, non-tender to light or deep palpation. No rebound or peritoneal signs. No masses or hsm.  MM moist, skin turger good.

## 2025-01-10 PROBLEM — E66.811 CLASS 1 OBESITY DUE TO EXCESS CALORIES WITH SERIOUS COMORBIDITY AND BODY MASS INDEX (BMI) OF 32.0 TO 32.9 IN ADULT: Status: ACTIVE | Noted: 2020-11-25

## 2025-01-10 PROBLEM — E66.09 CLASS 1 OBESITY DUE TO EXCESS CALORIES WITH SERIOUS COMORBIDITY AND BODY MASS INDEX (BMI) OF 32.0 TO 32.9 IN ADULT: Status: ACTIVE | Noted: 2020-11-25

## 2025-01-12 DIAGNOSIS — F41.1 GENERALIZED ANXIETY DISORDER: ICD-10-CM

## 2025-01-13 RX ORDER — ESCITALOPRAM OXALATE 20 MG/1
20 TABLET ORAL DAILY
Qty: 90 TABLET | Refills: 3 | Status: SHIPPED | OUTPATIENT
Start: 2025-01-13

## 2025-01-22 ENCOUNTER — MYC REFILL (OUTPATIENT)
Dept: FAMILY MEDICINE | Facility: CLINIC | Age: 30
End: 2025-01-22
Payer: COMMERCIAL

## 2025-01-22 DIAGNOSIS — K58.9 IRRITABLE BOWEL SYNDROME WITHOUT DIARRHEA: ICD-10-CM

## 2025-01-22 RX ORDER — SUCRALFATE 1 G/1
1 TABLET ORAL DAILY
Qty: 90 TABLET | Refills: 2 | Status: SHIPPED | OUTPATIENT
Start: 2025-01-22

## 2025-01-22 RX ORDER — SUCRALFATE 1 G/1
1 TABLET ORAL DAILY
Qty: 90 TABLET | Refills: 4 | OUTPATIENT
Start: 2025-01-22

## 2025-01-31 PROBLEM — C72.31: Status: ACTIVE | Noted: 2025-01-31

## 2025-02-06 ENCOUNTER — PATIENT OUTREACH (OUTPATIENT)
Dept: CARE COORDINATION | Facility: CLINIC | Age: 30
End: 2025-02-06
Payer: COMMERCIAL

## 2025-02-20 ENCOUNTER — PATIENT OUTREACH (OUTPATIENT)
Dept: CARE COORDINATION | Facility: CLINIC | Age: 30
End: 2025-02-20
Payer: COMMERCIAL

## 2025-03-31 ENCOUNTER — PATIENT OUTREACH (OUTPATIENT)
Dept: CARE COORDINATION | Facility: CLINIC | Age: 30
End: 2025-03-31
Payer: COMMERCIAL

## 2025-04-19 ENCOUNTER — HEALTH MAINTENANCE LETTER (OUTPATIENT)
Age: 30
End: 2025-04-19

## 2025-06-18 ENCOUNTER — OFFICE VISIT (OUTPATIENT)
Dept: FAMILY MEDICINE | Facility: CLINIC | Age: 30
End: 2025-06-18
Payer: COMMERCIAL

## 2025-06-18 VITALS
HEIGHT: 63 IN | BODY MASS INDEX: 32.73 KG/M2 | TEMPERATURE: 98.6 F | WEIGHT: 184.7 LBS | SYSTOLIC BLOOD PRESSURE: 106 MMHG | DIASTOLIC BLOOD PRESSURE: 72 MMHG | OXYGEN SATURATION: 97 % | HEART RATE: 82 BPM | RESPIRATION RATE: 16 BRPM

## 2025-06-18 DIAGNOSIS — E66.811 CLASS 1 OBESITY DUE TO EXCESS CALORIES WITH SERIOUS COMORBIDITY AND BODY MASS INDEX (BMI) OF 32.0 TO 32.9 IN ADULT: ICD-10-CM

## 2025-06-18 DIAGNOSIS — E78.5 HYPERLIPIDEMIA LDL GOAL <130: ICD-10-CM

## 2025-06-18 DIAGNOSIS — E66.09 CLASS 1 OBESITY DUE TO EXCESS CALORIES WITH SERIOUS COMORBIDITY AND BODY MASS INDEX (BMI) OF 32.0 TO 32.9 IN ADULT: ICD-10-CM

## 2025-06-18 DIAGNOSIS — E28.2 PCOS (POLYCYSTIC OVARIAN SYNDROME): ICD-10-CM

## 2025-06-18 PROCEDURE — 99214 OFFICE O/P EST MOD 30 MIN: CPT | Performed by: FAMILY MEDICINE

## 2025-06-18 PROCEDURE — G2211 COMPLEX E/M VISIT ADD ON: HCPCS | Performed by: FAMILY MEDICINE

## 2025-06-18 NOTE — PROGRESS NOTES
Assessment & Plan   Assessment & Plan  Class 1 obesity due to excess calories with serious comorbidity and body mass index (BMI) of 32.0 to 32.9 in adult  29 year old year old female in clinic today to discuss treatment of the following conditions through diet and lifestyle modification and weight loss:  1. Class 1 obesity due to excess calories with serious comorbidity and body mass index (BMI) of 32.0 to 32.9 in adult    2. Hyperlipidemia LDL goal <130    3. PCOS (polycystic ovarian syndrome)      The patient's weight loss result since the last visit was successful based on ongoing weight loss.  She is working to build her exercise capacity.   She gets bored with some types of exercise (prefers dancing).  Eating well.  Side effects greatly improved on zepbound.  Overall, she is doing very well.  She is down approximately 30% or 79 pounds since starting GLP-1 therapies back in 2022.  She struggled with side effects while on Wegovy for a couple of years (nausea, random emesis).  These side effects have completely subsided now that she is on Zepbound.  Unfortunately, her formulary has changed and she is being told that Zepbound will no longer be covered.  - Wegovy: As above, significant side effects that did not resolve despite being on the medicine for significant period of time.  - Saxenda: Filled but never taken.  She had does not have experience on Saxenda.  I do not recommend Saxenda for this individual.  Saxenda efficacy data would not predict that this patient loses 30% of total body weight.  I anticipate if she were to try Saxenda she will have side effects (given that it side effect profile is very similar to Wegovy) and she would regain weight given how well she has done overall.  Saxenda is an older and less efficacious medication that probably does not make sense for this individual.  She also struggles to take her medications daily.  This would be 1 additional thing that should after ember to do.  For  "this reason, I think she is unlikely to be successful with this medicine as well.  - Phentermine/topiramate: Contraindicated.  History of panic disorder.  - Orlistat: Contraindicated.  She has IBS.  I am concerned that her bowels are already difficult for her to control.  Introducing orlistat into that equation will likely result in severe side effects.    BMI: Body mass index is 32.72 kg/m .  Ideal body weight: 52.4 kg (115 lb 8.3 oz)  Adjusted ideal body weight: 65 kg (143 lb 3.1 oz)  Last clinic measured weight:   Wt Readings from Last 1 Encounters:   06/18/25 83.8 kg (184 lb 11.2 oz)     Current therapies:    Medications: YES currently zepbound   - Starting weight for GLP1 receptor agonist: 263 lb   - Total weight loss: 79 lbs (30%)    Nutritional goals/strategies: reviewed.   - caloric restriction: Yes   - time restriction: NO   - diet (macronutrient) framework: high protein/low carbohydrate    Movement:   - predominantly cardiovascular    Follow-up: 3 months (if starting new medication)    Orders:    tirzepatide-Weight Management (ZEPBOUND) 10 MG/0.5ML prefilled pen; Inject 0.5 mLs (10 mg) subcutaneously every 7 days.    Hyperlipidemia LDL goal <130    Orders:    tirzepatide-Weight Management (ZEPBOUND) 10 MG/0.5ML prefilled pen; Inject 0.5 mLs (10 mg) subcutaneously every 7 days.    PCOS (polycystic ovarian syndrome)    Orders:    tirzepatide-Weight Management (ZEPBOUND) 10 MG/0.5ML prefilled pen; Inject 0.5 mLs (10 mg) subcutaneously every 7 days.           BMI  Estimated body mass index is 32.72 kg/m  as calculated from the following:    Height as of this encounter: 1.6 m (5' 3\").    Weight as of this encounter: 83.8 kg (184 lb 11.2 oz).   Weight management plan: Specific weight management program called Comprehensive Weight Management discussed    The longitudinal plan of care for the diagnosis(es)/condition(s) as documented were addressed during this visit. Due to the added complexity in care, I will " "continue to support Melissa in the subsequent management and with ongoing continuity of care.    Subjective   Melissa is a 29 year old, presenting for the following health issues:  Weight Loss (Follow-up )        6/18/2025     8:19 AM   Additional Questions   Roomed by TRISTIAN     Patient presents for treatment of chronic, comorbid conditions using intensive therapeutic lifestyle interventions including nutrition, physical activity, and behavior management.   - successes: overall feels good.  Active.     - she has been on wegovy recently.   - struggles: consistency with nutrition.     - movement: dancing hours per day.     - tracking/journaling: ad farrah.  Protein rich   - hunger: Stable.    - medication benefits: zepbound helpful. Side effects: nasal drippage.      Wegovy: ongoing nausea. She frequently threw up while on this medication.  Loose BMs.   Saxenda: she never tried  Phentermine/topiramate at the time of the initial prescription but to be contraindicated given history of panic disorder  Orlistat: Not considered at the time of her initial prescription in 2022.  However, she has a history of irritable bowel syndrome.  Contraindicated.          History of Present Illness       Reason for visit:  Follow up for med    She eats 0-1 servings of fruits and vegetables daily.She consumes 1 sweetened beverage(s) daily.She exercises with enough effort to increase her heart rate 30 to 60 minutes per day.  She exercises with enough effort to increase her heart rate 3 or less days per week. She is missing 2 dose(s) of medications per week.  She is not taking prescribed medications regularly due to remembering to take.            Objective    /72 (BP Location: Left arm, Patient Position: Sitting, Cuff Size: Adult Regular)   Pulse 82   Temp 98.6  F (37  C) (Oral)   Resp 16   Ht 1.6 m (5' 3\")   Wt 83.8 kg (184 lb 11.2 oz)   LMP 06/07/2025   SpO2 97%   BMI 32.72 kg/m    Body mass index is 32.72 kg/m .  Physical " Exam  Nursing note reviewed.   Constitutional:       General: She is not in acute distress.     Appearance: Normal appearance. She is not ill-appearing.   HENT:      Head: Normocephalic and atraumatic.   Eyes:      Extraocular Movements: Extraocular movements intact.      Conjunctiva/sclera: Conjunctivae normal.   Pulmonary:      Effort: Pulmonary effort is normal.   Neurological:      Mental Status: She is alert and oriented to person, place, and time.   Psychiatric:         Attention and Perception: Attention normal.         Mood and Affect: Mood normal.         Speech: Speech normal.         Thought Content: Thought content normal.                    Signed Electronically by: Dom Resendiz MD

## 2025-06-18 NOTE — ASSESSMENT & PLAN NOTE
Orders:    tirzepatide-Weight Management (ZEPBOUND) 10 MG/0.5ML prefilled pen; Inject 0.5 mLs (10 mg) subcutaneously every 7 days.

## 2025-06-18 NOTE — ASSESSMENT & PLAN NOTE
29 year old year old female in clinic today to discuss treatment of the following conditions through diet and lifestyle modification and weight loss:  1. Class 1 obesity due to excess calories with serious comorbidity and body mass index (BMI) of 32.0 to 32.9 in adult    2. Hyperlipidemia LDL goal <130    3. PCOS (polycystic ovarian syndrome)      The patient's weight loss result since the last visit was successful based on ongoing weight loss.  She is working to build her exercise capacity.   She gets bored with some types of exercise (prefers dancing).  Eating well.  Side effects greatly improved on zepbound.  Overall, she is doing very well.  She is down approximately 30% or 79 pounds since starting GLP-1 therapies back in 2022.  She struggled with side effects while on Wegovy for a couple of years (nausea, random emesis).  These side effects have completely subsided now that she is on Zepbound.  Unfortunately, her formulary has changed and she is being told that Zepbound will no longer be covered.  - Wegovy: As above, significant side effects that did not resolve despite being on the medicine for significant period of time.  - Saxenda: Filled but never taken.  She had does not have experience on Saxenda.  I do not recommend Saxenda for this individual.  Saxenda efficacy data would not predict that this patient loses 30% of total body weight.  I anticipate if she were to try Saxenda she will have side effects (given that it side effect profile is very similar to Wegovy) and she would regain weight given how well she has done overall.  Saxenda is an older and less efficacious medication that probably does not make sense for this individual.  She also struggles to take her medications daily.  This would be 1 additional thing that should after ember to do.  For this reason, I think she is unlikely to be successful with this medicine as well.  - Phentermine/topiramate: Contraindicated.  History of panic disorder.  -  Orlistat: Contraindicated.  She has IBS.  I am concerned that her bowels are already difficult for her to control.  Introducing orlistat into that equation will likely result in severe side effects.    BMI: Body mass index is 32.72 kg/m .  Ideal body weight: 52.4 kg (115 lb 8.3 oz)  Adjusted ideal body weight: 65 kg (143 lb 3.1 oz)  Last clinic measured weight:   Wt Readings from Last 1 Encounters:   06/18/25 83.8 kg (184 lb 11.2 oz)     Current therapies:    Medications: YES currently zepbound   - Starting weight for GLP1 receptor agonist: 263 lb   - Total weight loss: 79 lbs (30%)    Nutritional goals/strategies: reviewed.   - caloric restriction: Yes   - time restriction: NO   - diet (macronutrient) framework: high protein/low carbohydrate    Movement:   - predominantly cardiovascular    Follow-up: 3 months (if starting new medication)    Orders:    tirzepatide-Weight Management (ZEPBOUND) 10 MG/0.5ML prefilled pen; Inject 0.5 mLs (10 mg) subcutaneously every 7 days.

## 2025-06-30 ENCOUNTER — TELEPHONE (OUTPATIENT)
Dept: FAMILY MEDICINE | Facility: CLINIC | Age: 30
End: 2025-06-30
Payer: COMMERCIAL

## 2025-06-30 NOTE — TELEPHONE ENCOUNTER
Prior Authorization Request  Who s requesting:  Provider  Pharmacy Name and Location: [facility] [address]  Medication Name: tirzepatide-Weight Management (ZEPBOUND) 10 MG/0.5ML prefilled pen   Insurance Plan: Blue Plus  Insurance Member ID Number:  [id number]   CoverMyMeds Key:   Informed patient that prior authorizations can take up to 10 business days for response:   NA  Okay to leave a detailed message: N/A    [facility] [employer] formulary change effective 7/1/25

## 2025-07-01 NOTE — TELEPHONE ENCOUNTER
Retail Pharmacy Prior Authorization Team   Phone: [phone]    Prior Authorization Not Needed per Insurance    Medication: ZEPBOUND 10 MG/0.5ML SC SOAJ  Insurance Company: [employer] - Phone [phone] Fax [phone]  Expected CoPay: $    Pharmacy [facility]illing the [id number] [place], [place] - [address]  Pharmacy Notified: YES  Patient Notified: YES (notified pharmacy)

## 2025-07-26 ENCOUNTER — MYC MEDICAL ADVICE (OUTPATIENT)
Dept: FAMILY MEDICINE | Facility: CLINIC | Age: 30
End: 2025-07-26
Payer: COMMERCIAL

## 2025-07-26 DIAGNOSIS — E78.5 HYPERLIPIDEMIA LDL GOAL <130: ICD-10-CM

## 2025-07-26 DIAGNOSIS — E66.811 CLASS 1 OBESITY DUE TO EXCESS CALORIES WITH SERIOUS COMORBIDITY AND BODY MASS INDEX (BMI) OF 32.0 TO 32.9 IN ADULT: Primary | ICD-10-CM

## 2025-07-26 DIAGNOSIS — E28.2 PCOS (POLYCYSTIC OVARIAN SYNDROME): ICD-10-CM

## 2025-07-26 DIAGNOSIS — E66.09 CLASS 1 OBESITY DUE TO EXCESS CALORIES WITH SERIOUS COMORBIDITY AND BODY MASS INDEX (BMI) OF 32.0 TO 32.9 IN ADULT: Primary | ICD-10-CM

## 2025-09-04 ENCOUNTER — PATIENT OUTREACH (OUTPATIENT)
Dept: CARE COORDINATION | Facility: CLINIC | Age: 30
End: 2025-09-04
Payer: COMMERCIAL